# Patient Record
Sex: MALE | Race: WHITE | NOT HISPANIC OR LATINO | Employment: FULL TIME | URBAN - METROPOLITAN AREA
[De-identification: names, ages, dates, MRNs, and addresses within clinical notes are randomized per-mention and may not be internally consistent; named-entity substitution may affect disease eponyms.]

---

## 2017-02-03 ENCOUNTER — LAB CONVERSION - ENCOUNTER (OUTPATIENT)
Dept: OTHER | Facility: OTHER | Age: 57
End: 2017-02-03

## 2017-02-03 LAB — PROSTATE SPECIFIC ANTIGEN TOTAL (HISTORICAL): 1.2 NG/ML

## 2017-02-06 ENCOUNTER — ALLSCRIPTS OFFICE VISIT (OUTPATIENT)
Dept: OTHER | Facility: OTHER | Age: 57
End: 2017-02-06

## 2017-02-06 LAB
BILIRUB UR QL STRIP: NORMAL
CLARITY UR: NORMAL
COLOR UR: YELLOW
GLUCOSE (HISTORICAL): NORMAL
HGB UR QL STRIP.AUTO: NORMAL
KETONES UR STRIP-MCNC: NORMAL MG/DL
LEUKOCYTE ESTERASE UR QL STRIP: NORMAL
NITRITE UR QL STRIP: NORMAL
PH UR STRIP.AUTO: 5 [PH]
PROT UR STRIP-MCNC: NORMAL MG/DL
SP GR UR STRIP.AUTO: 1.01
UROBILINOGEN UR QL STRIP.AUTO: NORMAL

## 2017-09-01 ENCOUNTER — ALLSCRIPTS OFFICE VISIT (OUTPATIENT)
Dept: OTHER | Facility: OTHER | Age: 57
End: 2017-09-01

## 2017-10-25 ENCOUNTER — ANESTHESIA (OUTPATIENT)
Dept: GASTROENTEROLOGY | Facility: HOSPITAL | Age: 57
End: 2017-10-25
Payer: COMMERCIAL

## 2017-10-25 ENCOUNTER — ANESTHESIA EVENT (OUTPATIENT)
Dept: GASTROENTEROLOGY | Facility: HOSPITAL | Age: 57
End: 2017-10-25
Payer: COMMERCIAL

## 2017-10-25 ENCOUNTER — GENERIC CONVERSION - ENCOUNTER (OUTPATIENT)
Dept: OTHER | Facility: OTHER | Age: 57
End: 2017-10-25

## 2017-10-25 ENCOUNTER — HOSPITAL ENCOUNTER (OUTPATIENT)
Facility: HOSPITAL | Age: 57
Setting detail: OUTPATIENT SURGERY
Discharge: HOME | End: 2017-10-25
Attending: INTERNAL MEDICINE | Admitting: INTERNAL MEDICINE
Payer: COMMERCIAL

## 2017-10-25 VITALS
TEMPERATURE: 98.2 F | BODY MASS INDEX: 25.74 KG/M2 | HEART RATE: 74 BPM | DIASTOLIC BLOOD PRESSURE: 68 MMHG | RESPIRATION RATE: 16 BRPM | OXYGEN SATURATION: 99 % | SYSTOLIC BLOOD PRESSURE: 122 MMHG | WEIGHT: 207 LBS | HEIGHT: 75 IN

## 2017-10-25 DIAGNOSIS — Z80.0 FAMILY HISTORY OF MALIGNANT NEOPLASM OF DIGESTIVE ORGAN: ICD-10-CM

## 2017-10-25 PROCEDURE — 88305 TISSUE EXAM BY PATHOLOGIST: CPT | Performed by: INTERNAL MEDICINE

## 2017-10-25 RX ORDER — SODIUM CHLORIDE 9 MG/ML
125 INJECTION, SOLUTION INTRAVENOUS CONTINUOUS
Status: DISCONTINUED | OUTPATIENT
Start: 2017-10-25 | End: 2017-10-25 | Stop reason: HOSPADM

## 2017-10-25 RX ORDER — LEVOTHYROXINE SODIUM 175 UG/1
175 TABLET ORAL DAILY
COMMUNITY

## 2017-10-25 RX ORDER — PROPOFOL 10 MG/ML
INJECTION, EMULSION INTRAVENOUS AS NEEDED
Status: DISCONTINUED | OUTPATIENT
Start: 2017-10-25 | End: 2017-10-25 | Stop reason: SURG

## 2017-10-25 RX ORDER — PROPOFOL 10 MG/ML
INJECTION, EMULSION INTRAVENOUS CONTINUOUS PRN
Status: DISCONTINUED | OUTPATIENT
Start: 2017-10-25 | End: 2017-10-25 | Stop reason: SURG

## 2017-10-25 RX ADMIN — PROPOFOL 100 MCG/KG/MIN: 10 INJECTION, EMULSION INTRAVENOUS at 09:50

## 2017-10-25 RX ADMIN — PROPOFOL 50 MG: 10 INJECTION, EMULSION INTRAVENOUS at 09:55

## 2017-10-25 RX ADMIN — PROPOFOL 50 MG: 10 INJECTION, EMULSION INTRAVENOUS at 09:50

## 2017-10-25 RX ADMIN — SODIUM CHLORIDE: 0.9 INJECTION, SOLUTION INTRAVENOUS at 09:30

## 2017-10-25 NOTE — CONSULTS
Assessment  1  Family history of colon cancer (V16 0) (Z80 0)   2  Diabetes mellitus with insulin therapy (250 00,V58 67) (E11 9,Z79  4)    Plan  Family history of colon cancer    · MoviPrep 100 GM Oral Solution Reconstituted; USE AS DIRECTED   Rx By: Nadir Vazquez; Dispense: 0 Days ; #:1 Solution Reconstituted; Refill: 0;For: Family history of colon cancer; JEIMY = N; Sent To: [facility]/PHARMACY #[id number]  · Follow-up PRN Evaluation and Treatment  Follow-up  Status: Complete  Done:  92RGP4883 10:32AM   Ordered; For: Family history of colon cancer; Ordered By: Nadir Vazquez Performed:  Due: 10UIJ8630   · COLONOSCOPY (GI, SURG); Status:Hold For - Scheduling; Requested LES:[id number];    Perform:[facility]; Order Comments:hospital; OQI:[id number]; Ordered; For:Family history of colon cancer; Ordered By:Jumana Lambert;    Discussion/Summary  Discussion Summary:   He is overdue for a surveillance colonoscopy because of his family history of colon cancer  I spoke to him about the benefits and risks of the procedure as well as instructions for the bowel preparation and answered all of his questions  Given his history of diabetes mellitus with insulin dependence and on a pump, I will schedule his procedure at her hospital       Chief Complaint  Chief Complaint Free Text Note Form: Pt consult for colon; denies active symptoms  History of Present Illness  HPI: He presents for evaluation because of a family history of colon cancer  His brother was diagnosed with colon cancer and recently passed away from surgical complications related to the disease  Mr Melissa Sahni last colonoscopy was approximately 8 years ago and it was unremarkable  He has not had any bleeding, abdominal pain, change in bowel habits, or weight loss  He also denies any upper GI symptoms such as reflux or dysphagia  History Reviewed: The history was obtained today from the patient and I agree with the documented history        Review of Systems  Complete-Male GI Adult:   Constitutional: No fever or chills, feels well, no tiredness, no recent weight gain or weight loss  Eyes: No complaints of eye pain, no red eyes, no discharge from eyes, no itchy eyes  ENT: no complaints of earache, no hearing loss, no nosebleeds, no nasal discharge, no sore throat, no hoarseness  Cardiovascular: No complaints of slow heart rate, no fast heart rate, no chest pain, no palpitations, no leg claudication, no lower extremity  Respiratory: No complaints of shortness of breath, no wheezing, no cough, no SOB on exertion, no orthopnea or PND  Gastrointestinal: No complaints of abdominal pain, no constipation, no nausea or vomiting, no diarrhea or bloody stools-- and-- as noted in HPI  Genitourinary: No complaints of dysuria, no incontinence, no hesitancy, no nocturia, no genital lesion, no testicular pain  Musculoskeletal: No complaints of arthralgia, no myalgias, no joint swelling or stiffness, no limb pain or swelling  Integumentary: No complaints of skin rash or skin lesions, no itching, no skin wound, no dry skin  Neurological: No compliants of headache, no confusion, no convulsions, no numbness or tingling, no dizziness or fainting, no limb weakness, no difficulty walking  Psychiatric: Is not suicidal, no sleep disturbances, no anxiety or depression, no change in personality, no emotional problems  Endocrine: No complaints of proptosis, no hot flashes, no muscle weakness, no erectile dysfunction, no deepening of the voice, no feelings of weakness  Hematologic/Lymphatic: No complaints of swollen glands, no swollen glands in the neck, does not bleed easily, no easy bruising  ROS Reviewed:   ROS reviewed  Active Problems  1  Elevated prostate specific antigen (PSA) (790 93) (R97 20)   2  Organic impotence (607 84) (N52 9)   3  Urinary tract infection (599 0) (N39 0)   4  Urine Volume Has Decreased (Oliguria) (788 5)    Past Medical History  1   History of Reported Prostate Antigen Blood Test  Active Problems And Past Medical History Reviewed: The active problems and past medical history were reviewed and updated today  Surgical History  1  History of Thyroid Surgery Total Thyroidectomy  Surgical History Reviewed: The surgical history was reviewed and updated today  Family History  Family History Reviewed: The family history was reviewed and updated today  Social History   · Alcohol Use (History)   · Never A Smoker  Social History Reviewed: The social history was reviewed and updated today  Current Meds   1  HumaLOG 100 UNIT/ML Subcutaneous Solution; Therapy: 86INX1477 to (Last RI:63ODZ4737)  Requested for: 12LGG8045 Ordered   2  SLPG Compound Medication; alprostadil 5mcg/ml, phentolamine 0 5 mg/ml, papaverine   15mg/ml; Therapy: 11KWM1566 to (Last Rx:09Jan2015) Ordered   3  Synthroid 175 MCG Oral Tablet; Therapy: 82HMM3701 to (Last Rx:29Sep4108)  Requested for: 02Liq4294 Ordered  Medication List Reviewed: The medication list was reviewed and updated today  Allergies  1  Penicillins    Vitals  Vital Signs    Recorded: 01Sep2017 10:16AM   Temperature 96 6 F, Tympanic   Heart Rate 81   Systolic 023, LUE, Sitting   Diastolic 79, LUE, Sitting   BP CUFF SIZE Large   Height 6 ft 3 in   Weight 210 lb 4 oz   BMI Calculated 26 28   BSA Calculated 2 24   O2 Saturation 98, RA     Physical Exam    Constitutional   General appearance: No acute distress, well appearing and well nourished  Eyes   Conjunctiva and lids: No swelling, erythema, or discharge  Pupils and irises: Equal, round and reactive to light  Ears, Nose, Mouth, and Throat   External inspection of ears and nose: Normal     Nasal mucosa, septum, and turbinates: Normal without edema or erythema  Oropharynx: Normal with no erythema, edema, exudate or lesions  Pulmonary   Respiratory effort: No increased work of breathing or signs of respiratory distress      Auscultation of lungs: Clear to auscultation, equal breath sounds bilaterally, no wheezes, no rales, no rhonci  Cardiovascular   Auscultation of heart: Normal rate and rhythm, normal S1 and S2, without murmurs  Examination of extremities for edema and/or varicosities: Normal     Abdomen   Abdomen: Abnormal  -- insulin pump in place  Liver and spleen: No hepatomegaly or splenomegaly  Lymphatic   Palpation of lymph nodes in neck: No lymphadenopathy  Musculoskeletal   Gait and station: Normal     Digits and nails: Normal without clubbing or cyanosis  Inspection/palpation of joints, bones, and muscles: Normal     Skin   Skin and subcutaneous tissue: Normal without rashes or lesions      Psychiatric   Orientation to person, place and time: Normal     Mood and affect: Normal          Signatures   Electronically signed by : uSjey Dobson MD; Sep  1 2017 10:33AM EST                       (Author)

## 2017-10-25 NOTE — ANESTHESIA PREPROCEDURE EVALUATION
Review of Systems/Medical History  Patient summary reviewed    No history of anesthetic complications     Cardiovascular  Negative cardio ROS    Pulmonary       GI/Hepatic            Endo/Other  Diabetes Insulin, History of thyroid disease , hypothyroidism,      GYN       Hematology   Musculoskeletal       Neurology   Psychology           Physical Exam    Airway    Mallampati score: II  TM Distance: >3 FB  Neck ROM: full     Dental       Cardiovascular  Comment: Negative ROS,     Pulmonary      Other Findings        Anesthesia Plan  ASA Score- 2       Anesthesia Type- IV sedation with anesthesia with ASA Monitors  Additional Monitors:   Airway Plan:           Induction- intravenous  Informed Consent- Anesthetic plan and risks discussed with patient  I personally reviewed this patient with the CRNA  Discussed and agreed on the Anesthesia Plan with the CRNA  Zarina Golden

## 2017-10-25 NOTE — OP NOTE
**** GI/ENDOSCOPY REPORT ****     PATIENT NAME: Liz Malone ------ VISIT ID:  Patient ID:   [id number] YOB: 1960     INTRODUCTION: Colonoscopy - A 65 male patient presents for an outpatient   Colonoscopy at [address] [place] [address]  PREVIOUS COLONOSCOPY: 8 years ago     INDICATIONS: Screening for family history of colorectal cancer, including   the patient's brother  CONSENT:  The benefits, risks, and alternatives to the procedure were   discussed and informed consent was obtained from the patient  PREPARATION: EKG, pulse, pulse oximetry and blood pressure were monitored   throughout the procedure  The patient was identified by myself both   verbally and by visual inspection of ID band  Airway Assessment   Classification: Airway class 2 - Visualization of the soft palate, fauces   and uvula  ASA Classification: Class 2 - Patient has mild to moderate   systemic disturbance that may or may not be related to the disorder   requiring surgery  MEDICATIONS: Anesthesia-check records     PROCEDURE:  The endoscope was passed without difficulty through the anus   under direct visualization and advanced to the cecum, confirmed by   appendiceal orifice and ileocecal valve  The scope was withdrawn and the   mucosa was carefully examined  The quality of the preparation was good  Retroflexion was performed  Cecal Intubation Time: 4 minutes(s) Scope   Withdrawal Time: 10 minutes(s)     RECTAL EXAM: Normal rectal exam      FINDINGS:  Two sessile polyps, measuring less than 5 mm in size, were   found in the colon  The polyps were removed by cold biopsy polypectomy  There was evidence of diverticulosis in the sigmoid colon  COMPLICATIONS: There were no complications  IMPRESSIONS: Two sessile polyps found in the colon; removed by cold biopsy   polypectomy  Diverticulosis found in the sigmoid colon       RECOMMENDATIONS: Follow-up on the results of the biopsy specimens  Colonoscopy recommended in 5 years or sooner if clinically indicated  ESTIMATED BLOOD LOSS:     PATHOLOGY SPECIMENS: Removed by cold biopsy polypectomy  PROCEDURE CODES:     ICD-9 Codes: V16 0 Family history of malignant neoplasm of   gastrointestinal tract 562 10 Diverticulosis of colon (without mention of   hemorrhage)     ICD-10 Codes: Z80 0 Family history of malignant neoplasm of digestive   organs K63 5 Polyp of colon K57 Diverticular disease of intestine     PERFORMED BY: ANNA Montez  on 10/25/2017  Version 1, electronically signed by ANNA Carr  on 10/25/2017   at 10:33

## 2017-10-25 NOTE — ANESTHESIA POSTPROCEDURE EVALUATION
Post-Op Assessment Note      CV Status:  Stable    Mental Status:  Alert and awake    Hydration Status:  Euvolemic    PONV Controlled:  Controlled    Airway Patency:  Patent    Post Op Vitals Reviewed: Yes          Staff: CRNA           /69 (10/25/17 1013)    Temp      Pulse 82 (10/25/17 1013)   Resp 16 (10/25/17 1013)    SpO2 99 % (10/25/17 1013)

## 2017-10-28 ENCOUNTER — GENERIC CONVERSION - ENCOUNTER (OUTPATIENT)
Dept: OTHER | Facility: OTHER | Age: 57
End: 2017-10-28

## 2017-11-07 ENCOUNTER — APPOINTMENT (OUTPATIENT)
Dept: LAB | Age: 57
End: 2017-11-07
Payer: COMMERCIAL

## 2017-11-07 ENCOUNTER — TRANSCRIBE ORDERS (OUTPATIENT)
Dept: ADMINISTRATIVE | Age: 57
End: 2017-11-07

## 2017-11-07 DIAGNOSIS — E89.0 POSTSURGICAL HYPOTHYROIDISM: ICD-10-CM

## 2017-11-07 DIAGNOSIS — C73 MALIGNANT NEOPLASM OF THYROID GLAND (HCC): Primary | ICD-10-CM

## 2017-11-07 DIAGNOSIS — C73 MALIGNANT NEOPLASM OF THYROID GLAND (HCC): ICD-10-CM

## 2017-11-07 DIAGNOSIS — E10.9 TYPE 1 DIABETES MELLITUS WITHOUT COMPLICATION (HCC): ICD-10-CM

## 2017-11-07 LAB
ALBUMIN SERPL BCP-MCNC: 3.5 G/DL (ref 3.5–5)
ALP SERPL-CCNC: 84 U/L (ref 46–116)
ALT SERPL W P-5'-P-CCNC: 40 U/L (ref 12–78)
ANION GAP SERPL CALCULATED.3IONS-SCNC: 3 MMOL/L (ref 4–13)
AST SERPL W P-5'-P-CCNC: 28 U/L (ref 5–45)
BASOPHILS # BLD AUTO: 0.04 THOUSANDS/ΜL (ref 0–0.1)
BASOPHILS NFR BLD AUTO: 1 % (ref 0–1)
BILIRUB SERPL-MCNC: 0.69 MG/DL (ref 0.2–1)
BILIRUB UR QL STRIP: NEGATIVE
BUN SERPL-MCNC: 16 MG/DL (ref 5–25)
CALCIUM SERPL-MCNC: 8.4 MG/DL (ref 8.3–10.1)
CHLORIDE SERPL-SCNC: 103 MMOL/L (ref 100–108)
CLARITY UR: CLEAR
CO2 SERPL-SCNC: 32 MMOL/L (ref 21–32)
COLOR UR: YELLOW
CREAT SERPL-MCNC: 0.95 MG/DL (ref 0.6–1.3)
CREAT UR-MCNC: 57.9 MG/DL
EOSINOPHIL # BLD AUTO: 0.2 THOUSAND/ΜL (ref 0–0.61)
EOSINOPHIL NFR BLD AUTO: 3 % (ref 0–6)
ERYTHROCYTE [DISTWIDTH] IN BLOOD BY AUTOMATED COUNT: 12.6 % (ref 11.6–15.1)
EST. AVERAGE GLUCOSE BLD GHB EST-MCNC: 146 MG/DL
GFR SERPL CREATININE-BSD FRML MDRD: 89 ML/MIN/1.73SQ M
GLUCOSE P FAST SERPL-MCNC: 124 MG/DL (ref 65–99)
GLUCOSE UR STRIP-MCNC: NEGATIVE MG/DL
HBA1C MFR BLD: 6.7 % (ref 4.2–6.3)
HCT VFR BLD AUTO: 42.3 % (ref 36.5–49.3)
HGB BLD-MCNC: 14.7 G/DL (ref 12–17)
HGB UR QL STRIP.AUTO: NEGATIVE
KETONES UR STRIP-MCNC: NEGATIVE MG/DL
LEUKOCYTE ESTERASE UR QL STRIP: NEGATIVE
LYMPHOCYTES # BLD AUTO: 1.73 THOUSANDS/ΜL (ref 0.6–4.47)
LYMPHOCYTES NFR BLD AUTO: 30 % (ref 14–44)
MAGNESIUM SERPL-MCNC: 2.5 MG/DL (ref 1.6–2.6)
MCH RBC QN AUTO: 30.9 PG (ref 26.8–34.3)
MCHC RBC AUTO-ENTMCNC: 34.8 G/DL (ref 31.4–37.4)
MCV RBC AUTO: 89 FL (ref 82–98)
MICROALBUMIN UR-MCNC: <5 MG/L (ref 0–20)
MICROALBUMIN/CREAT 24H UR: <9 MG/G CREATININE (ref 0–30)
MONOCYTES # BLD AUTO: 0.89 THOUSAND/ΜL (ref 0.17–1.22)
MONOCYTES NFR BLD AUTO: 15 % (ref 4–12)
NEUTROPHILS # BLD AUTO: 2.97 THOUSANDS/ΜL (ref 1.85–7.62)
NEUTS SEG NFR BLD AUTO: 51 % (ref 43–75)
NITRITE UR QL STRIP: NEGATIVE
NRBC BLD AUTO-RTO: 0 /100 WBCS
PH UR STRIP.AUTO: 7 [PH] (ref 4.5–8)
PHOSPHATE SERPL-MCNC: 3.1 MG/DL (ref 2.7–4.5)
PLATELET # BLD AUTO: 289 THOUSANDS/UL (ref 149–390)
PMV BLD AUTO: 11.1 FL (ref 8.9–12.7)
POTASSIUM SERPL-SCNC: 4 MMOL/L (ref 3.5–5.3)
PROT SERPL-MCNC: 6.8 G/DL (ref 6.4–8.2)
PROT UR STRIP-MCNC: NEGATIVE MG/DL
RBC # BLD AUTO: 4.75 MILLION/UL (ref 3.88–5.62)
SODIUM SERPL-SCNC: 138 MMOL/L (ref 136–145)
SP GR UR STRIP.AUTO: 1.01 (ref 1–1.03)
T4 FREE SERPL-MCNC: 1.33 NG/DL (ref 0.76–1.46)
TSH SERPL DL<=0.05 MIU/L-ACNC: 0.03 UIU/ML (ref 0.36–3.74)
UROBILINOGEN UR QL STRIP.AUTO: 0.2 E.U./DL
WBC # BLD AUTO: 5.86 THOUSAND/UL (ref 4.31–10.16)

## 2017-11-07 PROCEDURE — 86800 THYROGLOBULIN ANTIBODY: CPT

## 2017-11-07 PROCEDURE — 84100 ASSAY OF PHOSPHORUS: CPT

## 2017-11-07 PROCEDURE — 83735 ASSAY OF MAGNESIUM: CPT

## 2017-11-07 PROCEDURE — 36415 COLL VENOUS BLD VENIPUNCTURE: CPT

## 2017-11-07 PROCEDURE — 83036 HEMOGLOBIN GLYCOSYLATED A1C: CPT

## 2017-11-07 PROCEDURE — 81003 URINALYSIS AUTO W/O SCOPE: CPT | Performed by: SPECIALIST

## 2017-11-07 PROCEDURE — 85025 COMPLETE CBC W/AUTO DIFF WBC: CPT

## 2017-11-07 PROCEDURE — 84432 ASSAY OF THYROGLOBULIN: CPT

## 2017-11-07 PROCEDURE — 82043 UR ALBUMIN QUANTITATIVE: CPT | Performed by: SPECIALIST

## 2017-11-07 PROCEDURE — 80053 COMPREHEN METABOLIC PANEL: CPT

## 2017-11-07 PROCEDURE — 84443 ASSAY THYROID STIM HORMONE: CPT

## 2017-11-07 PROCEDURE — 84439 ASSAY OF FREE THYROXINE: CPT

## 2017-11-07 PROCEDURE — 82570 ASSAY OF URINE CREATININE: CPT | Performed by: SPECIALIST

## 2017-11-08 LAB
THYROGLOB AB SERPL-ACNC: <1 IU/ML (ref 0–0.9)
THYROGLOB SERPL-MCNC: <0.1 NG/ML (ref 1.4–29.2)

## 2018-01-13 VITALS
DIASTOLIC BLOOD PRESSURE: 80 MMHG | BODY MASS INDEX: 26.24 KG/M2 | SYSTOLIC BLOOD PRESSURE: 120 MMHG | WEIGHT: 211 LBS | HEIGHT: 75 IN

## 2018-01-14 VITALS
BODY MASS INDEX: 26.14 KG/M2 | DIASTOLIC BLOOD PRESSURE: 79 MMHG | HEART RATE: 81 BPM | OXYGEN SATURATION: 98 % | SYSTOLIC BLOOD PRESSURE: 128 MMHG | TEMPERATURE: 96.6 F | WEIGHT: 210.25 LBS | HEIGHT: 75 IN

## 2018-01-15 NOTE — RESULT NOTES
Discussion/Summary   No precancerous polyps found  Repeat colonoscopy in 5 years because of his family history of colon cancer  Verified Results  (1) TISSUE EXAM 25Oct2017 10:04AM Nova Brooks     Test Name Result Flag Reference   LAB AP CASE REPORT (Report)     Surgical Pathology Report             Case: [id number]                   Authorizing Provider: Luke Douglas MD      Collected:      10/25/2017 1004        Ordering Location:   [address] [place] [address]   Received:      10/25/2017 [facility]                               Pathologist:      Mai Thomas MD                             Specimen:  Large Intestine, Sigmoid Colon, cold bx, polyps   LAB AP FINAL DIAGNOSIS      A  Colon, sigmoid polyps, biopsies:  - Portions of benign colonic mucosa with mucosal lymphoid aggregates,   negative for dysplasia or carcinoma  Electronically signed by Mai Thomas MD on 10/27/2017 at 3:10 PM   LAB AP SURGICAL ADDITIONAL INFORMATION (Report)     All controls performed with the immunohistochemical stains reported above   reacted appropriately  These tests were developed and their performance   characteristics determined by [facility] or   [address] [place] [address]  They may not be cleared or approved by the [place]  Food and Drug Administration  The FDA has determined that such clearance   or approval is not necessary  These tests are used for clinical purposes  They should not be regarded as investigational or for research  This   laboratory has been approved by [facility], designated as a high-complexity   laboratory and is qualified to perform these tests  Interpretation performed at , [address]    LAB AP GROSS DESCRIPTION (Report)     A  The specimen is received in formalin, labeled with the patient's name   and hospital number, and is designated Sigmoid colon polyps   The   specimen consists of 3 tan-pink soft tissue fragments measuring 0 3 cm,   0 5 cm, and 0 6 cm in greatest dimension  Entirely submitted  One   cassette  Note: The estimated total formalin fixation time based upon information   provided by the submitting clinician and the standard processing schedule   is under 72 hours  MAS   LAB AP CLINICAL INFORMATION 2 polyps     2 polyps

## 2018-01-29 DIAGNOSIS — R97.20 ELEVATED PROSTATE SPECIFIC ANTIGEN (PSA): ICD-10-CM

## 2018-02-12 ENCOUNTER — TRANSCRIBE ORDERS (OUTPATIENT)
Dept: ADMINISTRATIVE | Age: 58
End: 2018-02-12

## 2018-02-12 ENCOUNTER — APPOINTMENT (OUTPATIENT)
Dept: LAB | Age: 58
End: 2018-02-12
Payer: COMMERCIAL

## 2018-02-12 DIAGNOSIS — R97.20 ELEVATED PSA: Primary | ICD-10-CM

## 2018-02-12 DIAGNOSIS — R97.20 ELEVATED PSA: ICD-10-CM

## 2018-02-12 LAB — PSA SERPL-MCNC: 2 NG/ML (ref 0–4)

## 2018-02-12 PROCEDURE — 36415 COLL VENOUS BLD VENIPUNCTURE: CPT

## 2018-02-12 PROCEDURE — G0103 PSA SCREENING: HCPCS

## 2018-02-21 NOTE — PROGRESS NOTES
2/22/2018    Aaron Bran  1960  [id number]      Assessment  -Elevated PSA  -History of UTI    Discussion/Plan  Jerson Dobson is a 65 y o  male being managed by Dr Caleb Downey        -We discussed that given patient's low risk of prostate cancer, CAROL will be performed at next visit in 1 year with PSA  -All questions answered, patient agrees with plan      History of Present Illness  65 y o  male with a history of elevated PSA and UTI presents today for follow up  PSA today is 2 0, which is within his baseline of 1 2-4 0  Patient has history of elevated PSA of 46 4 in 2014 s/p UTI  No significant family history of prostate cancer  He denies any gross hematuria, dysuria, voids without difficulty, and no recent UTI  Patient had been using Trimix in the past, but no longer needs and is able to obtain and sustain an erection  No overall changes in health  Review of Systems  Review of Systems   Constitutional: Negative  HENT: Negative  Respiratory: Negative  Cardiovascular: Negative  Gastrointestinal: Negative  Genitourinary: Negative for decreased urine volume, difficulty urinating, dysuria, flank pain, frequency, hematuria and urgency  Musculoskeletal: Negative  Skin: Negative  Neurological: Negative  Psychiatric/Behavioral: Negative  Past Medical History  Past Medical History:   Diagnosis Date    Cancer ([facility] [facility] )     thyroid    Diabetes mellitus ([facility] [facility] )     Disease of thyroid gland        Past Social History  Past Surgical History:   Procedure Laterality Date    IN COLONOSCOPY FLX DX W/COLLJ SPEC WHEN PFRMD N/A 10/25/2017    Procedure: COLONOSCOPY;  Surgeon: Sera Busch MD;  Location: [facility]; Service: Gastroenterology       Past Family History  No family history on file  Past Social history  Social History     Social History    Marital status:      Spouse name: N/A    Number of children: N/A    Years of education: N/A     Occupational History    Not on file  Social History Main Topics    Smoking status: Never Smoker    Smokeless tobacco: Never Used    Alcohol use Yes      Comment: rarely    Drug use: No    Sexual activity: Not on file     Other Topics Concern    Not on file     Social History Narrative    No narrative on file       Current Medications  Current Outpatient Prescriptions   Medication Sig Dispense Refill    insulin lispro (HumaLOG) 100 units/mL injection Inject 55 Units under the skin continuous      levothyroxine 175 mcg tablet Take 175 mcg by mouth daily       No current facility-administered medications for this visit  Allergies  Allergies   Allergen Reactions    Penicillins Hives       Past Medical History, Social History, Family History, medications and allergies were reviewed  Vitals  There were no vitals filed for this visit  Physical Exam  Physical Exam   Constitutional: He is oriented to person, place, and time  He appears well-developed and well-nourished  HENT:   Head: Normocephalic  Eyes: Pupils are equal, round, and reactive to light  Neck: Normal range of motion  Cardiovascular: Normal rate and regular rhythm  Pulmonary/Chest: Effort normal    Abdominal: Soft  Normal appearance  There is no CVA tenderness  Musculoskeletal: Normal range of motion  Neurological: He is alert and oriented to person, place, and time  He has normal reflexes  Skin: Skin is warm and dry  Psychiatric: He has a normal mood and affect   His behavior is normal  Judgment and thought content normal        Results  Lab Results   Component Value Date    PSA 2 0 02/12/2018    PSA 1 8 10/14/2014    PSA 4 0 06/09/2014     Lab Results   Component Value Date    GLUCOSE 124 10/14/2014    CALCIUM 8 4 11/07/2017     11/07/2017    K 4 0 11/07/2017    CO2 32 11/07/2017     11/07/2017    BUN 16 11/07/2017    CREATININE 0 95 11/07/2017     Lab Results   Component Value Date    WBC 5 86 11/07/2017    HGB 14 7 11/07/2017    HCT 42 3 11/07/2017    MCV 89 11/07/2017     11/07/2017     No results found for this or any previous visit (from the past 1 hour(s))    Lab Results   Component Value Date    PSA 2 0 02/12/2018    PSA 1 8 10/14/2014    PSA 4 0 06/09/2014

## 2018-02-22 ENCOUNTER — OFFICE VISIT (OUTPATIENT)
Dept: UROLOGY | Facility: AMBULATORY SURGERY CENTER | Age: 58
End: 2018-02-22
Payer: COMMERCIAL

## 2018-02-22 VITALS
DIASTOLIC BLOOD PRESSURE: 76 MMHG | BODY MASS INDEX: 25.82 KG/M2 | WEIGHT: 212 LBS | SYSTOLIC BLOOD PRESSURE: 138 MMHG | HEART RATE: 78 BPM | HEIGHT: 76 IN

## 2018-02-22 DIAGNOSIS — Z12.5 PROSTATE CANCER SCREENING: Primary | ICD-10-CM

## 2018-02-22 PROCEDURE — 99213 OFFICE O/P EST LOW 20 MIN: CPT | Performed by: NURSE PRACTITIONER

## 2018-02-22 RX ORDER — MONTELUKAST SODIUM 10 MG/1
TABLET ORAL
COMMUNITY
Start: 2018-01-15

## 2018-02-22 RX ORDER — AZITHROMYCIN 500 MG/1
TABLET, FILM COATED ORAL
COMMUNITY
Start: 2018-01-16 | End: 2021-12-17 | Stop reason: ALTCHOICE

## 2018-02-22 RX ORDER — POLYETHYLENE GLYCOL 3350, SODIUM SULFATE, SODIUM CHLORIDE, POTASSIUM CHLORIDE, ASCORBIC ACID, SODIUM ASCORBATE 7.5-2.691G
KIT ORAL
COMMUNITY
Start: 2017-09-01 | End: 2021-12-17 | Stop reason: ALTCHOICE

## 2018-05-06 ENCOUNTER — OFFICE VISIT (OUTPATIENT)
Dept: URGENT CARE | Age: 58
End: 2018-05-06
Payer: COMMERCIAL

## 2018-05-06 ENCOUNTER — APPOINTMENT (OUTPATIENT)
Dept: RADIOLOGY | Age: 58
End: 2018-05-06
Payer: COMMERCIAL

## 2018-05-06 VITALS
HEART RATE: 68 BPM | SYSTOLIC BLOOD PRESSURE: 122 MMHG | OXYGEN SATURATION: 98 % | BODY MASS INDEX: 25.82 KG/M2 | HEIGHT: 76 IN | DIASTOLIC BLOOD PRESSURE: 82 MMHG | RESPIRATION RATE: 18 BRPM | WEIGHT: 212 LBS | TEMPERATURE: 97.5 F

## 2018-05-06 DIAGNOSIS — S69.92XA JAMMED INTERPHALANGEAL JOINT OF FINGER OF LEFT HAND, INITIAL ENCOUNTER: Primary | ICD-10-CM

## 2018-05-06 DIAGNOSIS — S69.92XA INJURY, FINGER, LEFT, INITIAL ENCOUNTER: ICD-10-CM

## 2018-05-06 PROCEDURE — 99213 OFFICE O/P EST LOW 20 MIN: CPT | Performed by: FAMILY MEDICINE

## 2018-05-06 PROCEDURE — 73130 X-RAY EXAM OF HAND: CPT

## 2018-05-06 PROCEDURE — 29130 APPL FINGER SPLINT STATIC: CPT | Performed by: NURSE PRACTITIONER

## 2018-05-06 RX ORDER — LORATADINE 10 MG/1
10 TABLET ORAL DAILY
COMMUNITY
End: 2021-12-17 | Stop reason: ALTCHOICE

## 2018-05-06 NOTE — PROGRESS NOTES
Pt was playing tug a war with a dog 8 days ago and the dog somehow pulled  Pt's left index finger to the side and he felt pain instantly  The next day it started to swell  Has pain and swelling more at the PIP  Certain movements hurt more  Swelling didn't resolve and yesterday he was using it more, so today it was worse

## 2018-05-06 NOTE — PROGRESS NOTES
[facility]        NAME: Raina Leigh is a 65 y o  male  : 1960    MRN: [id number]  DATE: May 6, 2018  TIME: 7:25 PM    Assessment and Plan   Jammed finger (interphalangeal joint), right, initial encounter Daly Dominick  1  Jammed finger (interphalangeal joint), right, initial encounter  Splint   2  Injury, finger, left, initial encounter  XR hand 3+ vw left         Patient Instructions     Patient Instructions   Rest   Finger splint for comfort  Finger splint applied  Gentle stretching of finger  Motrin as needed  Xray- no acute fracture  Will call you if final read is different  Follow up with hand specialist         Chief Complaint     Chief Complaint   Patient presents with    Finger Swelling         History of Present Illness   Raina Leigh presents to the clinic c/o    This is a 65year old male here today with complaints of left index finger injury  He was playing tug of war with dog  He states he is finger was pulled to side and he felt pain  He noticed swelling and pain over the next 8 days  Injury happened 8 days ago  Bending makes pain worse  He continues to have swelling so he is concerned  He is left handed/         Review of Systems   Review of Systems   Constitutional: Negative  HENT: Negative  Musculoskeletal:        Left index finger pain  Neurological: Negative  Psychiatric/Behavioral: Negative            Current Medications     Long-Term Prescriptions   Medication Sig Dispense Refill    insulin lispro (HumaLOG) 100 units/mL injection Inject 55 Units under the skin continuous      levothyroxine 175 mcg tablet Take 175 mcg by mouth daily      loratadine (CLARITIN) 10 mg tablet Take 10 mg by mouth daily      montelukast (SINGULAIR) 10 mg tablet       PEG 3350-KCl-NaCl-NaSulf-Na Asc-C (MOVIPREP) 100 g Take by mouth         Current Allergies     Allergies as of 2018 - Reviewed 2018   Allergen Reaction Noted    Penicillins Hives 10/25/2017 The following portions of the patient's history were reviewed and updated as appropriate: allergies, current medications, past family history, past medical history, past social history, past surgical history and problem list     Objective   /82 (BP Location: Left arm, Patient Position: Sitting, Cuff Size: Standard)   Pulse 68   Temp 97 5 °F (36 4 °C) (Temporal)   Resp 18   Ht 6' 3 5" (1 918 m)   Wt 96 2 kg (212 lb)   SpO2 98%   BMI 26 15 kg/m²        Physical Exam     Physical Exam   Constitutional: He is oriented to person, place, and time  He appears well-developed and well-nourished  Pulmonary/Chest: Effort normal    Musculoskeletal:   Left index finger: TTP over the PIP joint  Swelling noted, no redness or erythema  Decreased ROM due to pain and swelling  Neurological: He is alert and oriented to person, place, and time  Skin: Skin is warm and dry  Psychiatric: He has a normal mood and affect  His behavior is normal    Nursing note and vitals reviewed  Radiology: left hand no acute fracture

## 2018-05-06 NOTE — PATIENT INSTRUCTIONS
Rest   Finger splint for comfort  Finger splint applied  Gentle stretching of finger  Motrin as needed  Xray- no acute fracture  Will call you if final read is different    Follow up with hand specialist

## 2018-07-14 ENCOUNTER — APPOINTMENT (OUTPATIENT)
Dept: LAB | Age: 58
End: 2018-07-14
Payer: COMMERCIAL

## 2018-07-14 ENCOUNTER — TRANSCRIBE ORDERS (OUTPATIENT)
Dept: ADMINISTRATIVE | Age: 58
End: 2018-07-14

## 2018-07-14 DIAGNOSIS — C73 MALIGNANT NEOPLASM OF THYROID GLAND (HCC): ICD-10-CM

## 2018-07-14 DIAGNOSIS — C73 MALIGNANT NEOPLASM OF THYROID GLAND (HCC): Primary | ICD-10-CM

## 2018-07-14 DIAGNOSIS — E10.9 TYPE 1 DIABETES MELLITUS WITHOUT COMPLICATION (HCC): ICD-10-CM

## 2018-07-14 DIAGNOSIS — E89.0 POSTSURGICAL HYPOTHYROIDISM: ICD-10-CM

## 2018-07-14 LAB
ALBUMIN SERPL BCP-MCNC: 3.5 G/DL (ref 3.5–5)
ALP SERPL-CCNC: 85 U/L (ref 46–116)
ALT SERPL W P-5'-P-CCNC: 42 U/L (ref 12–78)
ANION GAP SERPL CALCULATED.3IONS-SCNC: 3 MMOL/L (ref 4–13)
AST SERPL W P-5'-P-CCNC: 27 U/L (ref 5–45)
BASOPHILS # BLD AUTO: 0.06 THOUSANDS/ΜL (ref 0–0.1)
BASOPHILS NFR BLD AUTO: 1 % (ref 0–1)
BILIRUB SERPL-MCNC: 0.72 MG/DL (ref 0.2–1)
BILIRUB UR QL STRIP: NEGATIVE
BUN SERPL-MCNC: 18 MG/DL (ref 5–25)
CALCIUM SERPL-MCNC: 8.6 MG/DL (ref 8.3–10.1)
CHLORIDE SERPL-SCNC: 107 MMOL/L (ref 100–108)
CLARITY UR: CLEAR
CO2 SERPL-SCNC: 30 MMOL/L (ref 21–32)
COLOR UR: YELLOW
CREAT SERPL-MCNC: 1.04 MG/DL (ref 0.6–1.3)
CREAT UR-MCNC: 181 MG/DL
EOSINOPHIL # BLD AUTO: 0.2 THOUSAND/ΜL (ref 0–0.61)
EOSINOPHIL NFR BLD AUTO: 4 % (ref 0–6)
ERYTHROCYTE [DISTWIDTH] IN BLOOD BY AUTOMATED COUNT: 12.8 % (ref 11.6–15.1)
EST. AVERAGE GLUCOSE BLD GHB EST-MCNC: 137 MG/DL
GFR SERPL CREATININE-BSD FRML MDRD: 80 ML/MIN/1.73SQ M
GLUCOSE P FAST SERPL-MCNC: 140 MG/DL (ref 65–99)
GLUCOSE UR STRIP-MCNC: NEGATIVE MG/DL
HBA1C MFR BLD: 6.4 % (ref 4.2–6.3)
HCT VFR BLD AUTO: 44.7 % (ref 36.5–49.3)
HGB BLD-MCNC: 14.6 G/DL (ref 12–17)
HGB UR QL STRIP.AUTO: NEGATIVE
IMM GRANULOCYTES # BLD AUTO: 0.02 THOUSAND/UL (ref 0–0.2)
IMM GRANULOCYTES NFR BLD AUTO: 0 % (ref 0–2)
KETONES UR STRIP-MCNC: NEGATIVE MG/DL
LEUKOCYTE ESTERASE UR QL STRIP: NEGATIVE
LYMPHOCYTES # BLD AUTO: 1.85 THOUSANDS/ΜL (ref 0.6–4.47)
LYMPHOCYTES NFR BLD AUTO: 33 % (ref 14–44)
MAGNESIUM SERPL-MCNC: 2.3 MG/DL (ref 1.6–2.6)
MCH RBC QN AUTO: 30.5 PG (ref 26.8–34.3)
MCHC RBC AUTO-ENTMCNC: 32.7 G/DL (ref 31.4–37.4)
MCV RBC AUTO: 93 FL (ref 82–98)
MICROALBUMIN UR-MCNC: <5 MG/L (ref 0–20)
MICROALBUMIN/CREAT 24H UR: <3 MG/G CREATININE (ref 0–30)
MONOCYTES # BLD AUTO: 0.68 THOUSAND/ΜL (ref 0.17–1.22)
MONOCYTES NFR BLD AUTO: 12 % (ref 4–12)
NEUTROPHILS # BLD AUTO: 2.84 THOUSANDS/ΜL (ref 1.85–7.62)
NEUTS SEG NFR BLD AUTO: 50 % (ref 43–75)
NITRITE UR QL STRIP: NEGATIVE
NRBC BLD AUTO-RTO: 0 /100 WBCS
PH UR STRIP.AUTO: 7 [PH] (ref 4.5–8)
PHOSPHATE SERPL-MCNC: 3 MG/DL (ref 2.7–4.5)
PLATELET # BLD AUTO: 286 THOUSANDS/UL (ref 149–390)
PMV BLD AUTO: 10.8 FL (ref 8.9–12.7)
POTASSIUM SERPL-SCNC: 4.4 MMOL/L (ref 3.5–5.3)
PROT SERPL-MCNC: 6.9 G/DL (ref 6.4–8.2)
PROT UR STRIP-MCNC: NEGATIVE MG/DL
RBC # BLD AUTO: 4.79 MILLION/UL (ref 3.88–5.62)
SODIUM SERPL-SCNC: 140 MMOL/L (ref 136–145)
SP GR UR STRIP.AUTO: 1.02 (ref 1–1.03)
T4 FREE SERPL-MCNC: 1.23 NG/DL (ref 0.76–1.46)
TSH SERPL DL<=0.05 MIU/L-ACNC: 0.06 UIU/ML (ref 0.36–3.74)
UROBILINOGEN UR QL STRIP.AUTO: 1 E.U./DL
WBC # BLD AUTO: 5.65 THOUSAND/UL (ref 4.31–10.16)

## 2018-07-14 PROCEDURE — 81003 URINALYSIS AUTO W/O SCOPE: CPT | Performed by: SPECIALIST

## 2018-07-14 PROCEDURE — 82570 ASSAY OF URINE CREATININE: CPT | Performed by: SPECIALIST

## 2018-07-14 PROCEDURE — 85025 COMPLETE CBC W/AUTO DIFF WBC: CPT

## 2018-07-14 PROCEDURE — 83036 HEMOGLOBIN GLYCOSYLATED A1C: CPT

## 2018-07-14 PROCEDURE — 83735 ASSAY OF MAGNESIUM: CPT

## 2018-07-14 PROCEDURE — 82043 UR ALBUMIN QUANTITATIVE: CPT | Performed by: SPECIALIST

## 2018-07-14 PROCEDURE — 84443 ASSAY THYROID STIM HORMONE: CPT

## 2018-07-14 PROCEDURE — 36415 COLL VENOUS BLD VENIPUNCTURE: CPT

## 2018-07-14 PROCEDURE — 84439 ASSAY OF FREE THYROXINE: CPT

## 2018-07-14 PROCEDURE — 86800 THYROGLOBULIN ANTIBODY: CPT

## 2018-07-14 PROCEDURE — 80053 COMPREHEN METABOLIC PANEL: CPT

## 2018-07-14 PROCEDURE — 84100 ASSAY OF PHOSPHORUS: CPT

## 2018-07-14 PROCEDURE — 84432 ASSAY OF THYROGLOBULIN: CPT

## 2018-07-17 LAB
THYROGLOB AB SERPL-ACNC: <1 IU/ML (ref 0–0.9)
THYROGLOB SERPL-MCNC: <0.1 NG/ML (ref 1.4–29.2)

## 2019-03-09 ENCOUNTER — APPOINTMENT (OUTPATIENT)
Dept: LAB | Age: 59
End: 2019-03-09
Payer: COMMERCIAL

## 2019-03-09 ENCOUNTER — TRANSCRIBE ORDERS (OUTPATIENT)
Dept: ADMINISTRATIVE | Age: 59
End: 2019-03-09

## 2019-03-09 DIAGNOSIS — IMO0002 TYPE I DIABETES MELLITUS WITH RENAL MANIFESTATIONS, UNCONTROLLED: ICD-10-CM

## 2019-03-09 DIAGNOSIS — I51.9 MYXEDEMA HEART DISEASE: ICD-10-CM

## 2019-03-09 DIAGNOSIS — C73 MALIGNANT NEOPLASM OF THYROID GLAND (HCC): Primary | ICD-10-CM

## 2019-03-09 DIAGNOSIS — C73 MALIGNANT NEOPLASM OF THYROID GLAND (HCC): ICD-10-CM

## 2019-03-09 DIAGNOSIS — E03.9 MYXEDEMA HEART DISEASE: ICD-10-CM

## 2019-03-09 LAB
ALBUMIN SERPL BCP-MCNC: 3.7 G/DL (ref 3.5–5)
ALP SERPL-CCNC: 89 U/L (ref 46–116)
ALT SERPL W P-5'-P-CCNC: 51 U/L (ref 12–78)
ANION GAP SERPL CALCULATED.3IONS-SCNC: 4 MMOL/L (ref 4–13)
AST SERPL W P-5'-P-CCNC: 31 U/L (ref 5–45)
BASOPHILS # BLD AUTO: 0.08 THOUSANDS/ΜL (ref 0–0.1)
BASOPHILS NFR BLD AUTO: 1 % (ref 0–1)
BILIRUB SERPL-MCNC: 0.66 MG/DL (ref 0.2–1)
BILIRUB UR QL STRIP: NEGATIVE
BUN SERPL-MCNC: 22 MG/DL (ref 5–25)
CALCIUM SERPL-MCNC: 8.9 MG/DL (ref 8.3–10.1)
CHLORIDE SERPL-SCNC: 104 MMOL/L (ref 100–108)
CLARITY UR: CLEAR
CO2 SERPL-SCNC: 31 MMOL/L (ref 21–32)
COLOR UR: YELLOW
CREAT SERPL-MCNC: 1.04 MG/DL (ref 0.6–1.3)
CREAT UR-MCNC: 154 MG/DL
EOSINOPHIL # BLD AUTO: 0.23 THOUSAND/ΜL (ref 0–0.61)
EOSINOPHIL NFR BLD AUTO: 4 % (ref 0–6)
ERYTHROCYTE [DISTWIDTH] IN BLOOD BY AUTOMATED COUNT: 12.3 % (ref 11.6–15.1)
EST. AVERAGE GLUCOSE BLD GHB EST-MCNC: 160 MG/DL
GFR SERPL CREATININE-BSD FRML MDRD: 79 ML/MIN/1.73SQ M
GLUCOSE P FAST SERPL-MCNC: 100 MG/DL (ref 65–99)
GLUCOSE UR STRIP-MCNC: NEGATIVE MG/DL
HBA1C MFR BLD: 7.2 % (ref 4.2–6.3)
HCT VFR BLD AUTO: 45.8 % (ref 36.5–49.3)
HGB BLD-MCNC: 15.1 G/DL (ref 12–17)
HGB UR QL STRIP.AUTO: NEGATIVE
IMM GRANULOCYTES # BLD AUTO: 0.03 THOUSAND/UL (ref 0–0.2)
IMM GRANULOCYTES NFR BLD AUTO: 1 % (ref 0–2)
KETONES UR STRIP-MCNC: NEGATIVE MG/DL
LEUKOCYTE ESTERASE UR QL STRIP: NEGATIVE
LYMPHOCYTES # BLD AUTO: 1.88 THOUSANDS/ΜL (ref 0.6–4.47)
LYMPHOCYTES NFR BLD AUTO: 31 % (ref 14–44)
MAGNESIUM SERPL-MCNC: 2.4 MG/DL (ref 1.6–2.6)
MCH RBC QN AUTO: 30.5 PG (ref 26.8–34.3)
MCHC RBC AUTO-ENTMCNC: 33 G/DL (ref 31.4–37.4)
MCV RBC AUTO: 93 FL (ref 82–98)
MICROALBUMIN UR-MCNC: <5 MG/L (ref 0–20)
MICROALBUMIN/CREAT 24H UR: <3 MG/G CREATININE (ref 0–30)
MONOCYTES # BLD AUTO: 0.71 THOUSAND/ΜL (ref 0.17–1.22)
MONOCYTES NFR BLD AUTO: 12 % (ref 4–12)
NEUTROPHILS # BLD AUTO: 3.17 THOUSANDS/ΜL (ref 1.85–7.62)
NEUTS SEG NFR BLD AUTO: 51 % (ref 43–75)
NITRITE UR QL STRIP: NEGATIVE
NRBC BLD AUTO-RTO: 0 /100 WBCS
PH UR STRIP.AUTO: 7.5 [PH]
PHOSPHATE SERPL-MCNC: 3.2 MG/DL (ref 2.7–4.5)
PLATELET # BLD AUTO: 302 THOUSANDS/UL (ref 149–390)
PMV BLD AUTO: 10.9 FL (ref 8.9–12.7)
POTASSIUM SERPL-SCNC: 4.3 MMOL/L (ref 3.5–5.3)
PROT SERPL-MCNC: 7 G/DL (ref 6.4–8.2)
PROT UR STRIP-MCNC: NEGATIVE MG/DL
RBC # BLD AUTO: 4.95 MILLION/UL (ref 3.88–5.62)
SODIUM SERPL-SCNC: 139 MMOL/L (ref 136–145)
SP GR UR STRIP.AUTO: 1.02 (ref 1–1.03)
T4 FREE SERPL-MCNC: 1.16 NG/DL (ref 0.76–1.46)
TSH SERPL DL<=0.05 MIU/L-ACNC: 0.08 UIU/ML (ref 0.36–3.74)
UROBILINOGEN UR QL STRIP.AUTO: 0.2 E.U./DL
WBC # BLD AUTO: 6.1 THOUSAND/UL (ref 4.31–10.16)

## 2019-03-09 PROCEDURE — 83036 HEMOGLOBIN GLYCOSYLATED A1C: CPT

## 2019-03-09 PROCEDURE — 85025 COMPLETE CBC W/AUTO DIFF WBC: CPT

## 2019-03-09 PROCEDURE — 82570 ASSAY OF URINE CREATININE: CPT | Performed by: SPECIALIST

## 2019-03-09 PROCEDURE — 86800 THYROGLOBULIN ANTIBODY: CPT

## 2019-03-09 PROCEDURE — 82043 UR ALBUMIN QUANTITATIVE: CPT | Performed by: SPECIALIST

## 2019-03-09 PROCEDURE — 84100 ASSAY OF PHOSPHORUS: CPT

## 2019-03-09 PROCEDURE — 84443 ASSAY THYROID STIM HORMONE: CPT

## 2019-03-09 PROCEDURE — 84432 ASSAY OF THYROGLOBULIN: CPT

## 2019-03-09 PROCEDURE — 84439 ASSAY OF FREE THYROXINE: CPT

## 2019-03-09 PROCEDURE — 83735 ASSAY OF MAGNESIUM: CPT

## 2019-03-09 PROCEDURE — 81003 URINALYSIS AUTO W/O SCOPE: CPT | Performed by: SPECIALIST

## 2019-03-09 PROCEDURE — 36415 COLL VENOUS BLD VENIPUNCTURE: CPT

## 2019-03-09 PROCEDURE — 80053 COMPREHEN METABOLIC PANEL: CPT

## 2019-03-12 LAB
THYROGLOB AB SERPL-ACNC: <1 IU/ML (ref 0–0.9)
THYROGLOB SERPL-MCNC: <0.1 NG/ML (ref 1.4–29.2)

## 2019-09-21 ENCOUNTER — TRANSCRIBE ORDERS (OUTPATIENT)
Dept: ADMINISTRATIVE | Age: 59
End: 2019-09-21

## 2019-09-21 ENCOUNTER — APPOINTMENT (OUTPATIENT)
Dept: LAB | Age: 59
End: 2019-09-21
Payer: COMMERCIAL

## 2019-09-21 DIAGNOSIS — E10.8 TYPE 1 DIABETES MELLITUS WITH COMPLICATION (HCC): ICD-10-CM

## 2019-09-21 DIAGNOSIS — E03.9 ACQUIRED HYPOTHYROIDISM: ICD-10-CM

## 2019-09-21 DIAGNOSIS — E89.0 POST-OPERATIVE HYPOTHYROIDISM: ICD-10-CM

## 2019-09-21 DIAGNOSIS — C73 THYROID CANCER (HCC): Primary | ICD-10-CM

## 2019-09-21 DIAGNOSIS — C73 THYROID CANCER (HCC): ICD-10-CM

## 2019-09-21 LAB
ALBUMIN SERPL BCP-MCNC: 3.7 G/DL (ref 3.5–5)
ALP SERPL-CCNC: 86 U/L (ref 46–116)
ALT SERPL W P-5'-P-CCNC: 47 U/L (ref 12–78)
ANION GAP SERPL CALCULATED.3IONS-SCNC: 1 MMOL/L (ref 4–13)
AST SERPL W P-5'-P-CCNC: 29 U/L (ref 5–45)
BASOPHILS # BLD AUTO: 0.08 THOUSANDS/ΜL (ref 0–0.1)
BASOPHILS NFR BLD AUTO: 1 % (ref 0–1)
BILIRUB SERPL-MCNC: 0.87 MG/DL (ref 0.2–1)
BILIRUB UR QL STRIP: NEGATIVE
BUN SERPL-MCNC: 23 MG/DL (ref 5–25)
CALCIUM SERPL-MCNC: 8.8 MG/DL (ref 8.3–10.1)
CHLORIDE SERPL-SCNC: 104 MMOL/L (ref 100–108)
CLARITY UR: CLEAR
CO2 SERPL-SCNC: 32 MMOL/L (ref 21–32)
COLOR UR: YELLOW
CREAT SERPL-MCNC: 1 MG/DL (ref 0.6–1.3)
EOSINOPHIL # BLD AUTO: 0.2 THOUSAND/ΜL (ref 0–0.61)
EOSINOPHIL NFR BLD AUTO: 4 % (ref 0–6)
ERYTHROCYTE [DISTWIDTH] IN BLOOD BY AUTOMATED COUNT: 12.6 % (ref 11.6–15.1)
EST. AVERAGE GLUCOSE BLD GHB EST-MCNC: 140 MG/DL
GFR SERPL CREATININE-BSD FRML MDRD: 83 ML/MIN/1.73SQ M
GLUCOSE P FAST SERPL-MCNC: 119 MG/DL (ref 65–99)
GLUCOSE UR STRIP-MCNC: NEGATIVE MG/DL
HBA1C MFR BLD: 6.5 % (ref 4.2–6.3)
HCT VFR BLD AUTO: 44 % (ref 36.5–49.3)
HGB BLD-MCNC: 14.5 G/DL (ref 12–17)
HGB UR QL STRIP.AUTO: NEGATIVE
IMM GRANULOCYTES # BLD AUTO: 0.03 THOUSAND/UL (ref 0–0.2)
IMM GRANULOCYTES NFR BLD AUTO: 1 % (ref 0–2)
KETONES UR STRIP-MCNC: NEGATIVE MG/DL
LEUKOCYTE ESTERASE UR QL STRIP: NEGATIVE
LYMPHOCYTES # BLD AUTO: 1.91 THOUSANDS/ΜL (ref 0.6–4.47)
LYMPHOCYTES NFR BLD AUTO: 34 % (ref 14–44)
MAGNESIUM SERPL-MCNC: 2.2 MG/DL (ref 1.6–2.6)
MCH RBC QN AUTO: 30.1 PG (ref 26.8–34.3)
MCHC RBC AUTO-ENTMCNC: 33 G/DL (ref 31.4–37.4)
MCV RBC AUTO: 92 FL (ref 82–98)
MONOCYTES # BLD AUTO: 0.69 THOUSAND/ΜL (ref 0.17–1.22)
MONOCYTES NFR BLD AUTO: 12 % (ref 4–12)
NEUTROPHILS # BLD AUTO: 2.72 THOUSANDS/ΜL (ref 1.85–7.62)
NEUTS SEG NFR BLD AUTO: 48 % (ref 43–75)
NITRITE UR QL STRIP: NEGATIVE
NRBC BLD AUTO-RTO: 0 /100 WBCS
PH UR STRIP.AUTO: 7.5 [PH]
PHOSPHATE SERPL-MCNC: 2.7 MG/DL (ref 2.7–4.5)
PLATELET # BLD AUTO: 275 THOUSANDS/UL (ref 149–390)
PMV BLD AUTO: 10.8 FL (ref 8.9–12.7)
POTASSIUM SERPL-SCNC: 4.3 MMOL/L (ref 3.5–5.3)
PROT SERPL-MCNC: 6.8 G/DL (ref 6.4–8.2)
PROT UR STRIP-MCNC: NEGATIVE MG/DL
RBC # BLD AUTO: 4.81 MILLION/UL (ref 3.88–5.62)
SODIUM SERPL-SCNC: 137 MMOL/L (ref 136–145)
SP GR UR STRIP.AUTO: 1.02 (ref 1–1.03)
T4 FREE SERPL-MCNC: 1.24 NG/DL (ref 0.76–1.46)
TSH SERPL DL<=0.05 MIU/L-ACNC: 0.02 UIU/ML (ref 0.36–3.74)
UROBILINOGEN UR QL STRIP.AUTO: 0.2 E.U./DL
WBC # BLD AUTO: 5.63 THOUSAND/UL (ref 4.31–10.16)

## 2019-09-21 PROCEDURE — 36415 COLL VENOUS BLD VENIPUNCTURE: CPT

## 2019-09-21 PROCEDURE — 84439 ASSAY OF FREE THYROXINE: CPT

## 2019-09-21 PROCEDURE — 84443 ASSAY THYROID STIM HORMONE: CPT

## 2019-09-21 PROCEDURE — 85025 COMPLETE CBC W/AUTO DIFF WBC: CPT

## 2019-09-21 PROCEDURE — 84432 ASSAY OF THYROGLOBULIN: CPT

## 2019-09-21 PROCEDURE — 80053 COMPREHEN METABOLIC PANEL: CPT

## 2019-09-21 PROCEDURE — 81003 URINALYSIS AUTO W/O SCOPE: CPT | Performed by: SPECIALIST

## 2019-09-21 PROCEDURE — 84100 ASSAY OF PHOSPHORUS: CPT

## 2019-09-21 PROCEDURE — 83735 ASSAY OF MAGNESIUM: CPT

## 2019-09-21 PROCEDURE — 83036 HEMOGLOBIN GLYCOSYLATED A1C: CPT

## 2019-09-21 PROCEDURE — 86800 THYROGLOBULIN ANTIBODY: CPT

## 2019-09-24 LAB
THYROGLOB AB SERPL-ACNC: <1 IU/ML (ref 0–0.9)
THYROGLOB SERPL-MCNC: <0.1 NG/ML (ref 1.4–29.2)

## 2019-10-30 ENCOUNTER — TELEPHONE (OUTPATIENT)
Dept: UROLOGY | Facility: MEDICAL CENTER | Age: 59
End: 2019-10-30

## 2019-10-30 DIAGNOSIS — R97.20 ELEVATED PSA: Primary | ICD-10-CM

## 2019-10-30 NOTE — TELEPHONE ENCOUNTER
Patient of Dr Kacie Pratt seen in [facility] office  Patient has 1 year follow up coming up and is requesting updated psa order  Patient goes to [address] [place] [address]

## 2019-11-25 ENCOUNTER — APPOINTMENT (OUTPATIENT)
Dept: LAB | Age: 59
End: 2019-11-25
Payer: COMMERCIAL

## 2019-11-25 DIAGNOSIS — R97.20 ELEVATED PSA: ICD-10-CM

## 2019-11-25 LAB — PSA SERPL-MCNC: 1.2 NG/ML (ref 0–4)

## 2019-11-25 PROCEDURE — 84153 ASSAY OF PSA TOTAL: CPT

## 2019-11-26 ENCOUNTER — OFFICE VISIT (OUTPATIENT)
Dept: UROLOGY | Facility: AMBULATORY SURGERY CENTER | Age: 59
End: 2019-11-26
Payer: COMMERCIAL

## 2019-11-26 VITALS
DIASTOLIC BLOOD PRESSURE: 60 MMHG | HEIGHT: 75 IN | BODY MASS INDEX: 26.36 KG/M2 | WEIGHT: 212 LBS | HEART RATE: 65 BPM | SYSTOLIC BLOOD PRESSURE: 100 MMHG

## 2019-11-26 DIAGNOSIS — C61 PROSTATE CANCER (HCC): Primary | ICD-10-CM

## 2019-11-26 PROCEDURE — 99213 OFFICE O/P EST LOW 20 MIN: CPT | Performed by: NURSE PRACTITIONER

## 2019-11-26 NOTE — PROGRESS NOTES
11/26/2019      Chief Complaint   Patient presents with    Prostate cancer screening     psa 1 2    11/25/2019     Assessment and Plan    63 y o  male managed by Dr Lizette Caicedo    1  Routine Prostate Cancer Screening  · PSA performed 11-25-19 resulted as 1 2  · CAROL reveals a prostate size of approximately 40 g no nodules noted  · Repeat PSA and CAROL in 1 year    2  Erectile Dysfunction  · Used trimix in the past- currenly has no need to continue at this time    3  Benign Prostatic Hyperplasia  · Bladder Scan PVR and Uroflow at next office   · Follow up in the office in 1 year    History of Present Illness  Nahomy Wynn is a 63 y o  male here for follow up evaluation of urinary symptoms secondary to benign prostatic hyperplasia, routine prostate cancer screening and erectile dysfunction  Patient reports using Tri Mix in the past but currently has no need to continue at this time and wishes to defer any further erectile dysfunction interventions  Patient currently denies all lower urinary tract symptoms including dysuria, hematuria, urinary frequency and urgency  Reports sensation of complete bladder emptying with urination  Reports his urinary stream to be good  Patient's most recent PSA performed 11/25/2019 resulted as 1 2  He reports his general health to be well controlled with no significant complaints at this time  Component       PSA, Total   Latest Ref Rng & Units       0 0 - 4 0 ng/mL   2/12/2018      2:12 PM 2 0   11/25/2019      5:53 PM 1 2     PSA resulted 1 7 performed in 2017  PSA resulted 2 4 performed in 2016    Review of Systems   Constitutional: Negative for chills and fever  Respiratory: Negative for cough and shortness of breath  Cardiovascular: Negative for chest pain  Gastrointestinal: Negative for abdominal distention, abdominal pain, blood in stool, nausea and vomiting     Genitourinary: Negative for difficulty urinating, dysuria, enuresis, flank pain, frequency, hematuria and urgency  Skin: Negative for rash  Past Medical History  Past Medical History:   Diagnosis Date    Cancer ([facility] [address] )     thyroid    [facility]     Disease of thyroid gland        Past Social History  Past Surgical History:   Procedure Laterality Date    [place] COLONOSCOPY FLX DX W/COLLJ SPEC WHEN PFRMD N/A 10/25/2017    Procedure: COLONOSCOPY;  Surgeon: Eladia Gillette MD;  Location: [facility]; Service: Gastroenterology     Social History     Tobacco Use   Smoking Status Never Smoker   Smokeless Tobacco Never Used       Past Family History  History reviewed  No pertinent family history      Past Social history  Social History     Socioeconomic History    Marital status:      Spouse name: Not on file    Number of children: Not on file    Years of education: Not on file    Highest education level: Not on file   Occupational History    Not on file   Social Needs    Financial resource strain: Not on file    Food insecurity:     Worry: Not on file     Inability: Not on file    Transportation needs:     Medical: Not on file     Non-medical: Not on file   Tobacco Use    Smoking status: Never Smoker    Smokeless tobacco: Never Used   Substance and Sexual Activity    Alcohol use: Yes     Comment: rarely    Drug use: No    Sexual activity: Not on file   Lifestyle    Physical activity:     Days per week: Not on file     Minutes per session: Not on file    Stress: Not on file   Relationships    Social connections:     Talks on phone: Not on file     Gets together: Not on file     Attends [employer] service: Not on file     Active member of club or organization: Not on file     Attends meetings of clubs or organizations: Not on file     Relationship status: Not on file    Intimate partner violence:     Fear of current or ex partner: Not on file     Emotionally abused: Not on file     Physically abused: Not on file     Forced sexual activity: Not on file   Other Topics Concern    Not on file Social History Narrative    Not on file       Current Medications  Current Outpatient Medications   Medication Sig Dispense Refill    insulin lispro (HumaLOG) 100 units/mL injection Inject 55 Units under the skin continuous      levothyroxine 175 mcg tablet Take 175 mcg by mouth daily      PEG 3350-KCl-NaCl-NaSulf-Na Asc-C (MOVIPREP) 100 g Take by mouth      azithromycin (ZITHROMAX) 500 MG tablet       loratadine (CLARITIN) 10 mg tablet Take 10 mg by mouth daily      montelukast (SINGULAIR) 10 mg tablet        No current facility-administered medications for this visit  Allergies  Allergies   Allergen Reactions    Penicillins Hives         The following portions of the patient's history were reviewed and updated as appropriate: allergies, current medications, past medical history, past social history, past surgical history and problem list       Vitals  Vitals:    11/26/19 1517   BP: 100/60   BP Location: Left arm   Patient Position: Sitting   Cuff Size: Adult   Pulse: 65   Weight: 96 2 kg (212 lb)   Height: 6' 3" (1 905 m)     Physical Exam  Physical Exam   Constitutional: He is oriented to person, place, and time  He appears well-developed and well-nourished  No distress  HENT:   Head: Atraumatic  Cardiovascular: Normal rate  Pulmonary/Chest: Effort normal    Abdominal: Soft  Genitourinary: Testes normal and penis normal    Genitourinary Comments: Prostate noted to be approximately 40 g no nodularity noted   Musculoskeletal: Normal range of motion  Neurological: He is alert and oriented to person, place, and time  Skin: Skin is warm and dry  Psychiatric: He has a normal mood and affect  Vitals reviewed      Results  No results found for this or any previous visit (from the past 1 hour(s)) ]  Lab Results   Component Value Date    PSA 1 2 11/25/2019    PSA 2 0 02/12/2018    PSA 1 8 10/14/2014     Lab Results   Component Value Date    GLUCOSE 124 10/14/2014    CALCIUM 8 8 09/21/2019  10/14/2014    K 4 3 09/21/2019    CO2 32 09/21/2019     09/21/2019    BUN 23 09/21/2019    CREATININE 1 00 09/21/2019     Lab Results   Component Value Date    WBC 5 63 09/21/2019    HGB 14 5 09/21/2019    HCT 44 0 09/21/2019    MCV 92 09/21/2019     09/21/2019     Orders  Orders Placed This Encounter   Procedures    PSA, Total Screen     This is a patient instruction: This test is non-fasting  Please drink two glasses of water morning of bloodwork          Standing Status:   Future     Standing Expiration Date:   5/26/2021       KERI Gonzalez

## 2019-11-26 NOTE — PATIENT INSTRUCTIONS
Repeat PSA in 1 year  Santiago the office for concerning symptoms- urinary frequency, urgency, or incomplete bladder emptying

## 2020-07-14 ENCOUNTER — APPOINTMENT (OUTPATIENT)
Dept: LAB | Age: 60
End: 2020-07-14
Payer: COMMERCIAL

## 2020-07-14 ENCOUNTER — TRANSCRIBE ORDERS (OUTPATIENT)
Dept: ADMINISTRATIVE | Age: 60
End: 2020-07-14

## 2020-07-14 DIAGNOSIS — E03.9 ACQUIRED HYPOTHYROIDISM: Primary | ICD-10-CM

## 2020-07-14 DIAGNOSIS — E03.9 ACQUIRED HYPOTHYROIDISM: ICD-10-CM

## 2020-07-14 DIAGNOSIS — C73 THYROID CANCER (HCC): ICD-10-CM

## 2020-07-14 LAB
ALBUMIN SERPL BCP-MCNC: 3.8 G/DL (ref 3.5–5)
ALP SERPL-CCNC: 90 U/L (ref 46–116)
ALT SERPL W P-5'-P-CCNC: 36 U/L (ref 12–78)
ANION GAP SERPL CALCULATED.3IONS-SCNC: 3 MMOL/L (ref 4–13)
AST SERPL W P-5'-P-CCNC: 21 U/L (ref 5–45)
BASOPHILS # BLD AUTO: 0.05 THOUSANDS/ΜL (ref 0–0.1)
BASOPHILS NFR BLD AUTO: 1 % (ref 0–1)
BILIRUB SERPL-MCNC: 0.83 MG/DL (ref 0.2–1)
BILIRUB UR QL STRIP: NEGATIVE
BUN SERPL-MCNC: 17 MG/DL (ref 5–25)
CALCIUM SERPL-MCNC: 9.1 MG/DL (ref 8.3–10.1)
CHLORIDE SERPL-SCNC: 103 MMOL/L (ref 100–108)
CLARITY UR: CLEAR
CO2 SERPL-SCNC: 30 MMOL/L (ref 21–32)
COLOR UR: YELLOW
CREAT SERPL-MCNC: 0.92 MG/DL (ref 0.6–1.3)
CREAT UR-MCNC: 97.4 MG/DL
EOSINOPHIL # BLD AUTO: 0.13 THOUSAND/ΜL (ref 0–0.61)
EOSINOPHIL NFR BLD AUTO: 2 % (ref 0–6)
ERYTHROCYTE [DISTWIDTH] IN BLOOD BY AUTOMATED COUNT: 12.9 % (ref 11.6–15.1)
EST. AVERAGE GLUCOSE BLD GHB EST-MCNC: 140 MG/DL
GFR SERPL CREATININE-BSD FRML MDRD: 91 ML/MIN/1.73SQ M
GLUCOSE P FAST SERPL-MCNC: 239 MG/DL (ref 65–99)
GLUCOSE UR STRIP-MCNC: ABNORMAL MG/DL
HBA1C MFR BLD: 6.5 %
HCT VFR BLD AUTO: 48.3 % (ref 36.5–49.3)
HGB BLD-MCNC: 16 G/DL (ref 12–17)
HGB UR QL STRIP.AUTO: NEGATIVE
IMM GRANULOCYTES # BLD AUTO: 0.03 THOUSAND/UL (ref 0–0.2)
IMM GRANULOCYTES NFR BLD AUTO: 1 % (ref 0–2)
KETONES UR STRIP-MCNC: ABNORMAL MG/DL
LEUKOCYTE ESTERASE UR QL STRIP: NEGATIVE
LYMPHOCYTES # BLD AUTO: 1.43 THOUSANDS/ΜL (ref 0.6–4.47)
LYMPHOCYTES NFR BLD AUTO: 27 % (ref 14–44)
MAGNESIUM SERPL-MCNC: 2.5 MG/DL (ref 1.6–2.6)
MCH RBC QN AUTO: 31.1 PG (ref 26.8–34.3)
MCHC RBC AUTO-ENTMCNC: 33.1 G/DL (ref 31.4–37.4)
MCV RBC AUTO: 94 FL (ref 82–98)
MICROALBUMIN UR-MCNC: 5.9 MG/L (ref 0–20)
MICROALBUMIN/CREAT 24H UR: 6 MG/G CREATININE (ref 0–30)
MONOCYTES # BLD AUTO: 0.51 THOUSAND/ΜL (ref 0.17–1.22)
MONOCYTES NFR BLD AUTO: 10 % (ref 4–12)
NEUTROPHILS # BLD AUTO: 3.23 THOUSANDS/ΜL (ref 1.85–7.62)
NEUTS SEG NFR BLD AUTO: 59 % (ref 43–75)
NITRITE UR QL STRIP: NEGATIVE
NRBC BLD AUTO-RTO: 0 /100 WBCS
PH UR STRIP.AUTO: 6 [PH]
PHOSPHATE SERPL-MCNC: 3.2 MG/DL (ref 2.7–4.5)
PLATELET # BLD AUTO: 284 THOUSANDS/UL (ref 149–390)
PMV BLD AUTO: 11 FL (ref 8.9–12.7)
POTASSIUM SERPL-SCNC: 4.9 MMOL/L (ref 3.5–5.3)
PROT SERPL-MCNC: 7.7 G/DL (ref 6.4–8.2)
PROT UR STRIP-MCNC: NEGATIVE MG/DL
RBC # BLD AUTO: 5.15 MILLION/UL (ref 3.88–5.62)
SODIUM SERPL-SCNC: 136 MMOL/L (ref 136–145)
SP GR UR STRIP.AUTO: 1.02 (ref 1–1.03)
T4 FREE SERPL-MCNC: 1.42 NG/DL (ref 0.76–1.46)
TSH SERPL DL<=0.05 MIU/L-ACNC: 0.03 UIU/ML (ref 0.36–3.74)
UROBILINOGEN UR QL STRIP.AUTO: 0.2 E.U./DL
WBC # BLD AUTO: 5.38 THOUSAND/UL (ref 4.31–10.16)

## 2020-07-14 PROCEDURE — 84100 ASSAY OF PHOSPHORUS: CPT

## 2020-07-14 PROCEDURE — 86800 THYROGLOBULIN ANTIBODY: CPT

## 2020-07-14 PROCEDURE — 84443 ASSAY THYROID STIM HORMONE: CPT

## 2020-07-14 PROCEDURE — 83735 ASSAY OF MAGNESIUM: CPT

## 2020-07-14 PROCEDURE — 81003 URINALYSIS AUTO W/O SCOPE: CPT | Performed by: SPECIALIST

## 2020-07-14 PROCEDURE — 80053 COMPREHEN METABOLIC PANEL: CPT

## 2020-07-14 PROCEDURE — 83036 HEMOGLOBIN GLYCOSYLATED A1C: CPT

## 2020-07-14 PROCEDURE — 82570 ASSAY OF URINE CREATININE: CPT | Performed by: SPECIALIST

## 2020-07-14 PROCEDURE — 36415 COLL VENOUS BLD VENIPUNCTURE: CPT

## 2020-07-14 PROCEDURE — 84439 ASSAY OF FREE THYROXINE: CPT

## 2020-07-14 PROCEDURE — 84432 ASSAY OF THYROGLOBULIN: CPT

## 2020-07-14 PROCEDURE — 85025 COMPLETE CBC W/AUTO DIFF WBC: CPT

## 2020-07-14 PROCEDURE — 82043 UR ALBUMIN QUANTITATIVE: CPT | Performed by: SPECIALIST

## 2020-07-15 LAB
THYROGLOB AB SERPL-ACNC: <1 IU/ML (ref 0–0.9)
THYROGLOB SERPL-MCNC: <0.1 NG/ML (ref 1.4–29.2)

## 2020-11-04 ENCOUNTER — LAB (OUTPATIENT)
Dept: LAB | Age: 60
End: 2020-11-04
Payer: COMMERCIAL

## 2020-11-04 ENCOUNTER — TRANSCRIBE ORDERS (OUTPATIENT)
Dept: ADMINISTRATIVE | Age: 60
End: 2020-11-04

## 2020-11-04 DIAGNOSIS — E03.9 MYXEDEMA HEART DISEASE: ICD-10-CM

## 2020-11-04 DIAGNOSIS — C73 MALIGNANT NEOPLASM OF THYROID GLAND (HCC): ICD-10-CM

## 2020-11-04 DIAGNOSIS — E10.69 TYPE 1 DIABETES MELLITUS WITH OTHER SPECIFIED COMPLICATION (HCC): ICD-10-CM

## 2020-11-04 DIAGNOSIS — E89.0 POSTSURGICAL HYPOTHYROIDISM: ICD-10-CM

## 2020-11-04 DIAGNOSIS — I51.9 MYXEDEMA HEART DISEASE: ICD-10-CM

## 2020-11-04 DIAGNOSIS — C61 PROSTATE CANCER (HCC): ICD-10-CM

## 2020-11-04 DIAGNOSIS — C73 MALIGNANT NEOPLASM OF THYROID GLAND (HCC): Primary | ICD-10-CM

## 2020-11-04 LAB
ALBUMIN SERPL BCP-MCNC: 3.7 G/DL (ref 3.5–5)
ALP SERPL-CCNC: 92 U/L (ref 46–116)
ALT SERPL W P-5'-P-CCNC: 62 U/L (ref 12–78)
ANION GAP SERPL CALCULATED.3IONS-SCNC: -1 MMOL/L (ref 4–13)
AST SERPL W P-5'-P-CCNC: 30 U/L (ref 5–45)
BACTERIA UR QL AUTO: NORMAL /HPF
BASOPHILS # BLD AUTO: 0.07 THOUSANDS/ΜL (ref 0–0.1)
BASOPHILS NFR BLD AUTO: 1 % (ref 0–1)
BILIRUB SERPL-MCNC: 0.55 MG/DL (ref 0.2–1)
BILIRUB UR QL STRIP: NEGATIVE
BUN SERPL-MCNC: 18 MG/DL (ref 5–25)
CALCIUM SERPL-MCNC: 9.3 MG/DL (ref 8.3–10.1)
CHLORIDE SERPL-SCNC: 106 MMOL/L (ref 100–108)
CLARITY UR: CLEAR
CO2 SERPL-SCNC: 34 MMOL/L (ref 21–32)
COLOR UR: YELLOW
CORTIS AM PEAK SERPL-MCNC: 22 UG/DL (ref 4.2–22.4)
CREAT SERPL-MCNC: 1.04 MG/DL (ref 0.6–1.3)
CREAT UR-MCNC: 232 MG/DL
EOSINOPHIL # BLD AUTO: 0.19 THOUSAND/ΜL (ref 0–0.61)
EOSINOPHIL NFR BLD AUTO: 3 % (ref 0–6)
ERYTHROCYTE [DISTWIDTH] IN BLOOD BY AUTOMATED COUNT: 12.6 % (ref 11.6–15.1)
EST. AVERAGE GLUCOSE BLD GHB EST-MCNC: 143 MG/DL
FOLATE SERPL-MCNC: 15.1 NG/ML (ref 3.1–17.5)
GFR SERPL CREATININE-BSD FRML MDRD: 78 ML/MIN/1.73SQ M
GLUCOSE P FAST SERPL-MCNC: 183 MG/DL (ref 65–99)
GLUCOSE UR STRIP-MCNC: NEGATIVE MG/DL
HBA1C MFR BLD: 6.6 %
HCT VFR BLD AUTO: 44.9 % (ref 36.5–49.3)
HGB BLD-MCNC: 15.1 G/DL (ref 12–17)
HGB UR QL STRIP.AUTO: NEGATIVE
IMM GRANULOCYTES # BLD AUTO: 0.03 THOUSAND/UL (ref 0–0.2)
IMM GRANULOCYTES NFR BLD AUTO: 1 % (ref 0–2)
KETONES UR STRIP-MCNC: NEGATIVE MG/DL
LEUKOCYTE ESTERASE UR QL STRIP: NEGATIVE
LYMPHOCYTES # BLD AUTO: 1.51 THOUSANDS/ΜL (ref 0.6–4.47)
LYMPHOCYTES NFR BLD AUTO: 26 % (ref 14–44)
MAGNESIUM SERPL-MCNC: 2.4 MG/DL (ref 1.6–2.6)
MCH RBC QN AUTO: 31.1 PG (ref 26.8–34.3)
MCHC RBC AUTO-ENTMCNC: 33.6 G/DL (ref 31.4–37.4)
MCV RBC AUTO: 92 FL (ref 82–98)
MICROALBUMIN UR-MCNC: 8.1 MG/L (ref 0–20)
MICROALBUMIN/CREAT 24H UR: 3 MG/G CREATININE (ref 0–30)
MONOCYTES # BLD AUTO: 0.63 THOUSAND/ΜL (ref 0.17–1.22)
MONOCYTES NFR BLD AUTO: 11 % (ref 4–12)
NEUTROPHILS # BLD AUTO: 3.33 THOUSANDS/ΜL (ref 1.85–7.62)
NEUTS SEG NFR BLD AUTO: 58 % (ref 43–75)
NITRITE UR QL STRIP: NEGATIVE
NON-SQ EPI CELLS URNS QL MICRO: NORMAL /HPF
NRBC BLD AUTO-RTO: 0 /100 WBCS
PH UR STRIP.AUTO: 6 [PH]
PHOSPHATE SERPL-MCNC: 3.3 MG/DL (ref 2.7–4.5)
PLATELET # BLD AUTO: 276 THOUSANDS/UL (ref 149–390)
PMV BLD AUTO: 10.3 FL (ref 8.9–12.7)
POTASSIUM SERPL-SCNC: 4.8 MMOL/L (ref 3.5–5.3)
PROT SERPL-MCNC: 7.1 G/DL (ref 6.4–8.2)
PROT UR STRIP-MCNC: NEGATIVE MG/DL
PSA SERPL-MCNC: 1.3 NG/ML (ref 0–4)
RBC # BLD AUTO: 4.86 MILLION/UL (ref 3.88–5.62)
RBC #/AREA URNS AUTO: NORMAL /HPF
SODIUM SERPL-SCNC: 139 MMOL/L (ref 136–145)
SP GR UR STRIP.AUTO: 1.03 (ref 1–1.03)
T4 FREE SERPL-MCNC: 1.26 NG/DL (ref 0.76–1.46)
TSH SERPL DL<=0.05 MIU/L-ACNC: 0.1 UIU/ML (ref 0.36–3.74)
UROBILINOGEN UR QL STRIP.AUTO: 0.2 E.U./DL
VIT B12 SERPL-MCNC: 865 PG/ML (ref 100–900)
WBC # BLD AUTO: 5.76 THOUSAND/UL (ref 4.31–10.16)
WBC #/AREA URNS AUTO: NORMAL /HPF

## 2020-11-04 PROCEDURE — 82533 TOTAL CORTISOL: CPT

## 2020-11-04 PROCEDURE — 84439 ASSAY OF FREE THYROXINE: CPT

## 2020-11-04 PROCEDURE — 81001 URINALYSIS AUTO W/SCOPE: CPT | Performed by: SPECIALIST

## 2020-11-04 PROCEDURE — 83036 HEMOGLOBIN GLYCOSYLATED A1C: CPT

## 2020-11-04 PROCEDURE — 84100 ASSAY OF PHOSPHORUS: CPT

## 2020-11-04 PROCEDURE — 80053 COMPREHEN METABOLIC PANEL: CPT

## 2020-11-04 PROCEDURE — 86255 FLUORESCENT ANTIBODY SCREEN: CPT

## 2020-11-04 PROCEDURE — 82746 ASSAY OF FOLIC ACID SERUM: CPT

## 2020-11-04 PROCEDURE — 82607 VITAMIN B-12: CPT

## 2020-11-04 PROCEDURE — 83516 IMMUNOASSAY NONANTIBODY: CPT

## 2020-11-04 PROCEDURE — 83835 ASSAY OF METANEPHRINES: CPT

## 2020-11-04 PROCEDURE — 82043 UR ALBUMIN QUANTITATIVE: CPT | Performed by: SPECIALIST

## 2020-11-04 PROCEDURE — G0103 PSA SCREENING: HCPCS

## 2020-11-04 PROCEDURE — 82024 ASSAY OF ACTH: CPT

## 2020-11-04 PROCEDURE — 85025 COMPLETE CBC W/AUTO DIFF WBC: CPT

## 2020-11-04 PROCEDURE — 82784 ASSAY IGA/IGD/IGG/IGM EACH: CPT

## 2020-11-04 PROCEDURE — 83735 ASSAY OF MAGNESIUM: CPT

## 2020-11-04 PROCEDURE — 84443 ASSAY THYROID STIM HORMONE: CPT

## 2020-11-04 PROCEDURE — 36415 COLL VENOUS BLD VENIPUNCTURE: CPT

## 2020-11-04 PROCEDURE — 82570 ASSAY OF URINE CREATININE: CPT | Performed by: SPECIALIST

## 2020-11-05 ENCOUNTER — TRANSCRIBE ORDERS (OUTPATIENT)
Dept: ADMINISTRATIVE | Facility: HOSPITAL | Age: 60
End: 2020-11-05

## 2020-11-05 DIAGNOSIS — C73 MALIGNANT NEOPLASM OF THYROID GLAND (HCC): ICD-10-CM

## 2020-11-05 DIAGNOSIS — E03.2 HYPOTHYROIDISM DUE TO MEDICAMENTS AND OTHER EXOGENOUS SUBSTANCES: Primary | ICD-10-CM

## 2020-11-05 DIAGNOSIS — E10.9 TYPE 1 DIABETES MELLITUS WITHOUT COMPLICATIONS (HCC): ICD-10-CM

## 2020-11-05 LAB — ACTH PLAS-MCNC: 17.9 PG/ML (ref 7.2–63.3)

## 2020-11-06 ENCOUNTER — HOSPITAL ENCOUNTER (OUTPATIENT)
Dept: RADIOLOGY | Age: 60
Discharge: HOME | End: 2020-11-06
Payer: COMMERCIAL

## 2020-11-06 DIAGNOSIS — E03.9 HYPOTHYROIDISM, UNSPECIFIED TYPE: ICD-10-CM

## 2020-11-06 DIAGNOSIS — C73 THYROID CANCER (HCC): ICD-10-CM

## 2020-11-06 DIAGNOSIS — E10.9 TYPE 1 DIABETES MELLITUS WITHOUT COMPLICATION (HCC): ICD-10-CM

## 2020-11-06 LAB
ENDOMYSIUM IGA SER QL: POSITIVE
GLIADIN PEPTIDE IGA SER-ACNC: 75 UNITS (ref 0–19)
GLIADIN PEPTIDE IGG SER-ACNC: 21 UNITS (ref 0–19)
IGA SERPL-MCNC: 331 MG/DL (ref 90–386)
TTG IGA SER-ACNC: 22 U/ML (ref 0–3)
TTG IGG SER-ACNC: 3 U/ML (ref 0–5)

## 2020-11-06 PROCEDURE — 76536 US EXAM OF HEAD AND NECK: CPT

## 2020-11-11 LAB
METANEPH FREE SERPL-MCNC: 40.2 PG/ML (ref 0–88)
NORMETANEPHRINE SERPL-MCNC: 62.8 PG/ML (ref 0–136.8)

## 2020-11-12 ENCOUNTER — HOSPITAL ENCOUNTER (OUTPATIENT)
Dept: CT IMAGING | Facility: HOSPITAL | Age: 60
Discharge: HOME | End: 2020-11-12
Attending: SPECIALIST
Payer: COMMERCIAL

## 2020-11-12 DIAGNOSIS — E03.2 HYPOTHYROIDISM DUE TO MEDICAMENTS AND OTHER EXOGENOUS SUBSTANCES: ICD-10-CM

## 2020-11-12 DIAGNOSIS — C73 MALIGNANT NEOPLASM OF THYROID GLAND (HCC): ICD-10-CM

## 2020-11-12 DIAGNOSIS — E10.9 TYPE 1 DIABETES MELLITUS WITHOUT COMPLICATIONS (HCC): ICD-10-CM

## 2020-11-12 PROCEDURE — G1004 CDSM NDSC: HCPCS

## 2020-11-12 PROCEDURE — 75571 CT HRT W/O DYE W/CA TEST: CPT

## 2020-11-30 ENCOUNTER — OFFICE VISIT (OUTPATIENT)
Dept: UROLOGY | Facility: AMBULATORY SURGERY CENTER | Age: 60
End: 2020-11-30
Payer: COMMERCIAL

## 2020-11-30 VITALS
DIASTOLIC BLOOD PRESSURE: 68 MMHG | BODY MASS INDEX: 26 KG/M2 | WEIGHT: 208 LBS | SYSTOLIC BLOOD PRESSURE: 128 MMHG | HEART RATE: 74 BPM

## 2020-11-30 DIAGNOSIS — Z12.5 PROSTATE CANCER SCREENING: Primary | ICD-10-CM

## 2020-11-30 PROCEDURE — 99213 OFFICE O/P EST LOW 20 MIN: CPT | Performed by: NURSE PRACTITIONER

## 2021-03-10 DIAGNOSIS — Z23 ENCOUNTER FOR IMMUNIZATION: ICD-10-CM

## 2021-11-13 ENCOUNTER — APPOINTMENT (OUTPATIENT)
Dept: LAB | Age: 61
End: 2021-11-13
Payer: COMMERCIAL

## 2021-11-13 DIAGNOSIS — C73 MALIGNANT NEOPLASM OF THYROID GLAND (HCC): ICD-10-CM

## 2021-11-13 DIAGNOSIS — I51.9 MYXEDEMA HEART DISEASE: ICD-10-CM

## 2021-11-13 DIAGNOSIS — E03.2 IATROGENIC HYPOTHYROIDISM: ICD-10-CM

## 2021-11-13 DIAGNOSIS — Z12.5 PROSTATE CANCER SCREENING: ICD-10-CM

## 2021-11-13 DIAGNOSIS — E03.9 MYXEDEMA HEART DISEASE: ICD-10-CM

## 2021-11-13 DIAGNOSIS — E10.69 TYPE 1 DIABETES MELLITUS WITH OTHER SPECIFIED COMPLICATION (HCC): ICD-10-CM

## 2021-11-13 LAB
ALBUMIN SERPL BCP-MCNC: 3.4 G/DL (ref 3.5–5)
ALP SERPL-CCNC: 91 U/L (ref 46–116)
ALT SERPL W P-5'-P-CCNC: 40 U/L (ref 12–78)
ANION GAP SERPL CALCULATED.3IONS-SCNC: 4 MMOL/L (ref 4–13)
AST SERPL W P-5'-P-CCNC: 27 U/L (ref 5–45)
BASOPHILS # BLD AUTO: 0.09 THOUSANDS/ΜL (ref 0–0.1)
BASOPHILS NFR BLD AUTO: 2 % (ref 0–1)
BILIRUB SERPL-MCNC: 0.8 MG/DL (ref 0.2–1)
BILIRUB UR QL STRIP: NEGATIVE
BUN SERPL-MCNC: 19 MG/DL (ref 5–25)
CALCIUM ALBUM COR SERPL-MCNC: 10 MG/DL (ref 8.3–10.1)
CALCIUM SERPL-MCNC: 9.5 MG/DL (ref 8.3–10.1)
CHLORIDE SERPL-SCNC: 106 MMOL/L (ref 100–108)
CHOLEST SERPL-MCNC: 171 MG/DL (ref 50–200)
CLARITY UR: CLEAR
CO2 SERPL-SCNC: 28 MMOL/L (ref 21–32)
COLOR UR: YELLOW
CREAT SERPL-MCNC: 1.04 MG/DL (ref 0.6–1.3)
EOSINOPHIL # BLD AUTO: 0.19 THOUSAND/ΜL (ref 0–0.61)
EOSINOPHIL NFR BLD AUTO: 3 % (ref 0–6)
ERYTHROCYTE [DISTWIDTH] IN BLOOD BY AUTOMATED COUNT: 12.6 % (ref 11.6–15.1)
EST. AVERAGE GLUCOSE BLD GHB EST-MCNC: 137 MG/DL
GFR SERPL CREATININE-BSD FRML MDRD: 78 ML/MIN/1.73SQ M
GLUCOSE P FAST SERPL-MCNC: 157 MG/DL (ref 65–99)
GLUCOSE UR STRIP-MCNC: NEGATIVE MG/DL
HBA1C MFR BLD: 6.4 %
HCT VFR BLD AUTO: 45.5 % (ref 36.5–49.3)
HDLC SERPL-MCNC: 45 MG/DL
HGB BLD-MCNC: 15.4 G/DL (ref 12–17)
HGB UR QL STRIP.AUTO: NEGATIVE
IMM GRANULOCYTES # BLD AUTO: 0.04 THOUSAND/UL (ref 0–0.2)
IMM GRANULOCYTES NFR BLD AUTO: 1 % (ref 0–2)
KETONES UR STRIP-MCNC: NEGATIVE MG/DL
LDLC SERPL CALC-MCNC: 111 MG/DL (ref 0–100)
LEUKOCYTE ESTERASE UR QL STRIP: NEGATIVE
LYMPHOCYTES # BLD AUTO: 1.74 THOUSANDS/ΜL (ref 0.6–4.47)
LYMPHOCYTES NFR BLD AUTO: 32 % (ref 14–44)
MAGNESIUM SERPL-MCNC: 2.1 MG/DL (ref 1.6–2.6)
MCH RBC QN AUTO: 30.6 PG (ref 26.8–34.3)
MCHC RBC AUTO-ENTMCNC: 33.8 G/DL (ref 31.4–37.4)
MCV RBC AUTO: 91 FL (ref 82–98)
MONOCYTES # BLD AUTO: 0.53 THOUSAND/ΜL (ref 0.17–1.22)
MONOCYTES NFR BLD AUTO: 10 % (ref 4–12)
NEUTROPHILS # BLD AUTO: 2.94 THOUSANDS/ΜL (ref 1.85–7.62)
NEUTS SEG NFR BLD AUTO: 52 % (ref 43–75)
NITRITE UR QL STRIP: NEGATIVE
NONHDLC SERPL-MCNC: 126 MG/DL
NRBC BLD AUTO-RTO: 0 /100 WBCS
PH UR STRIP.AUTO: 7 [PH]
PHOSPHATE SERPL-MCNC: 3.2 MG/DL (ref 2.3–4.1)
PLATELET # BLD AUTO: 299 THOUSANDS/UL (ref 149–390)
PMV BLD AUTO: 10.2 FL (ref 8.9–12.7)
POTASSIUM SERPL-SCNC: 4.1 MMOL/L (ref 3.5–5.3)
PROT SERPL-MCNC: 7 G/DL (ref 6.4–8.2)
PROT UR STRIP-MCNC: NEGATIVE MG/DL
PSA SERPL-MCNC: 1.6 NG/ML (ref 0–4)
RBC # BLD AUTO: 5.03 MILLION/UL (ref 3.88–5.62)
SODIUM SERPL-SCNC: 138 MMOL/L (ref 136–145)
SP GR UR STRIP.AUTO: 1.01 (ref 1–1.03)
T4 FREE SERPL-MCNC: 1.25 NG/DL (ref 0.76–1.46)
TRIGL SERPL-MCNC: 76 MG/DL
TSH SERPL DL<=0.05 MIU/L-ACNC: 0.05 UIU/ML (ref 0.36–3.74)
URATE SERPL-MCNC: 5.1 MG/DL (ref 4.2–8)
UROBILINOGEN UR QL STRIP.AUTO: 0.2 E.U./DL
WBC # BLD AUTO: 5.53 THOUSAND/UL (ref 4.31–10.16)

## 2021-11-13 PROCEDURE — 80061 LIPID PANEL: CPT

## 2021-11-13 PROCEDURE — 84550 ASSAY OF BLOOD/URIC ACID: CPT

## 2021-11-13 PROCEDURE — 83036 HEMOGLOBIN GLYCOSYLATED A1C: CPT

## 2021-11-13 PROCEDURE — 36415 COLL VENOUS BLD VENIPUNCTURE: CPT

## 2021-11-13 PROCEDURE — 83735 ASSAY OF MAGNESIUM: CPT

## 2021-11-13 PROCEDURE — 84432 ASSAY OF THYROGLOBULIN: CPT

## 2021-11-13 PROCEDURE — 86800 THYROGLOBULIN ANTIBODY: CPT

## 2021-11-13 PROCEDURE — G0103 PSA SCREENING: HCPCS

## 2021-11-13 PROCEDURE — 84100 ASSAY OF PHOSPHORUS: CPT

## 2021-11-13 PROCEDURE — 81003 URINALYSIS AUTO W/O SCOPE: CPT

## 2021-11-13 PROCEDURE — 80053 COMPREHEN METABOLIC PANEL: CPT

## 2021-11-13 PROCEDURE — 84439 ASSAY OF FREE THYROXINE: CPT

## 2021-11-13 PROCEDURE — 84443 ASSAY THYROID STIM HORMONE: CPT

## 2021-11-13 PROCEDURE — 85025 COMPLETE CBC W/AUTO DIFF WBC: CPT

## 2021-11-16 LAB
THYROGLOB AB SERPL-ACNC: <1 IU/ML (ref 0–0.9)
THYROGLOB SERPL-MCNC: <0.1 NG/ML (ref 1.4–29.2)

## 2021-12-17 ENCOUNTER — OFFICE VISIT (OUTPATIENT)
Dept: UROLOGY | Facility: AMBULATORY SURGERY CENTER | Age: 61
End: 2021-12-17
Payer: COMMERCIAL

## 2021-12-17 VITALS
SYSTOLIC BLOOD PRESSURE: 126 MMHG | HEIGHT: 75 IN | WEIGHT: 207 LBS | DIASTOLIC BLOOD PRESSURE: 70 MMHG | BODY MASS INDEX: 25.74 KG/M2

## 2021-12-17 DIAGNOSIS — Z12.5 SCREENING FOR PROSTATE CANCER: ICD-10-CM

## 2021-12-17 DIAGNOSIS — C61 PROSTATE CANCER (HCC): Primary | ICD-10-CM

## 2021-12-17 LAB
SL AMB  POCT GLUCOSE, UA: NORMAL
SL AMB LEUKOCYTE ESTERASE,UA: NORMAL
SL AMB POCT BILIRUBIN,UA: NORMAL
SL AMB POCT BLOOD,UA: NORMAL
SL AMB POCT CLARITY,UA: CLEAR
SL AMB POCT COLOR,UA: YELLOW
SL AMB POCT KETONES,UA: NORMAL
SL AMB POCT NITRITE,UA: NORMAL
SL AMB POCT PH,UA: 6
SL AMB POCT SPECIFIC GRAVITY,UA: 1.02
SL AMB POCT URINE PROTEIN: NORMAL
SL AMB POCT UROBILINOGEN: 0.2

## 2021-12-17 PROCEDURE — 81002 URINALYSIS NONAUTO W/O SCOPE: CPT | Performed by: NURSE PRACTITIONER

## 2021-12-17 PROCEDURE — 99213 OFFICE O/P EST LOW 20 MIN: CPT | Performed by: NURSE PRACTITIONER

## 2021-12-17 RX ORDER — CETIRIZINE HYDROCHLORIDE 10 MG/1
10 TABLET ORAL DAILY
COMMUNITY

## 2023-02-18 ENCOUNTER — APPOINTMENT (OUTPATIENT)
Dept: LAB | Age: 63
End: 2023-02-18

## 2023-02-18 DIAGNOSIS — E87.0 TYPE I DIABETES MELLITUS WITH HYPEROSMOLAR COMA (HCC): ICD-10-CM

## 2023-02-18 DIAGNOSIS — C73 MALIGNANT NEOPLASM OF THYROID GLAND (HCC): ICD-10-CM

## 2023-02-18 DIAGNOSIS — E03.9 MYXEDEMA HEART DISEASE: ICD-10-CM

## 2023-02-18 DIAGNOSIS — E10.65 TYPE I DIABETES MELLITUS WITH HYPEROSMOLAR COMA (HCC): ICD-10-CM

## 2023-02-18 DIAGNOSIS — I51.9 MYXEDEMA HEART DISEASE: ICD-10-CM

## 2023-02-18 DIAGNOSIS — E89.0 POSTSURGICAL HYPOTHYROIDISM: ICD-10-CM

## 2023-02-18 DIAGNOSIS — E10.69 TYPE I DIABETES MELLITUS WITH HYPEROSMOLAR COMA (HCC): ICD-10-CM

## 2023-02-18 LAB
ALBUMIN SERPL BCP-MCNC: 3.7 G/DL (ref 3.5–5)
ALP SERPL-CCNC: 82 U/L (ref 46–116)
ALT SERPL W P-5'-P-CCNC: 32 U/L (ref 12–78)
ANION GAP SERPL CALCULATED.3IONS-SCNC: 2 MMOL/L (ref 4–13)
AST SERPL W P-5'-P-CCNC: 22 U/L (ref 5–45)
BACTERIA UR QL AUTO: ABNORMAL /HPF
BASOPHILS # BLD AUTO: 0.06 THOUSANDS/ÂΜL (ref 0–0.1)
BASOPHILS NFR BLD AUTO: 1 % (ref 0–1)
BILIRUB SERPL-MCNC: 0.77 MG/DL (ref 0.2–1)
BILIRUB UR QL STRIP: NEGATIVE
BUN SERPL-MCNC: 22 MG/DL (ref 5–25)
CALCIUM SERPL-MCNC: 9.2 MG/DL (ref 8.3–10.1)
CHLORIDE SERPL-SCNC: 106 MMOL/L (ref 96–108)
CLARITY UR: CLEAR
CO2 SERPL-SCNC: 29 MMOL/L (ref 21–32)
COLOR UR: ABNORMAL
CREAT SERPL-MCNC: 0.92 MG/DL (ref 0.6–1.3)
EOSINOPHIL # BLD AUTO: 0.13 THOUSAND/ÂΜL (ref 0–0.61)
EOSINOPHIL NFR BLD AUTO: 2 % (ref 0–6)
ERYTHROCYTE [DISTWIDTH] IN BLOOD BY AUTOMATED COUNT: 12.6 % (ref 11.6–15.1)
GFR SERPL CREATININE-BSD FRML MDRD: 89 ML/MIN/1.73SQ M
GLUCOSE P FAST SERPL-MCNC: 143 MG/DL (ref 65–99)
GLUCOSE UR STRIP-MCNC: NEGATIVE MG/DL
HCT VFR BLD AUTO: 45.6 % (ref 36.5–49.3)
HGB BLD-MCNC: 15.3 G/DL (ref 12–17)
HGB UR QL STRIP.AUTO: NEGATIVE
IMM GRANULOCYTES # BLD AUTO: 0.03 THOUSAND/UL (ref 0–0.2)
IMM GRANULOCYTES NFR BLD AUTO: 1 % (ref 0–2)
KETONES UR STRIP-MCNC: NEGATIVE MG/DL
LEUKOCYTE ESTERASE UR QL STRIP: NEGATIVE
LYMPHOCYTES # BLD AUTO: 1.65 THOUSANDS/ÂΜL (ref 0.6–4.47)
LYMPHOCYTES NFR BLD AUTO: 30 % (ref 14–44)
MAGNESIUM SERPL-MCNC: 2.2 MG/DL (ref 1.6–2.6)
MCH RBC QN AUTO: 30 PG (ref 26.8–34.3)
MCHC RBC AUTO-ENTMCNC: 33.6 G/DL (ref 31.4–37.4)
MCV RBC AUTO: 89 FL (ref 82–98)
MONOCYTES # BLD AUTO: 0.61 THOUSAND/ÂΜL (ref 0.17–1.22)
MONOCYTES NFR BLD AUTO: 11 % (ref 4–12)
MUCOUS THREADS UR QL AUTO: ABNORMAL
NEUTROPHILS # BLD AUTO: 2.96 THOUSANDS/ÂΜL (ref 1.85–7.62)
NEUTS SEG NFR BLD AUTO: 55 % (ref 43–75)
NITRITE UR QL STRIP: NEGATIVE
NON-SQ EPI CELLS URNS QL MICRO: ABNORMAL /HPF
NRBC BLD AUTO-RTO: 0 /100 WBCS
PH UR STRIP.AUTO: 6 [PH]
PHOSPHATE SERPL-MCNC: 3.4 MG/DL (ref 2.3–4.1)
PLATELET # BLD AUTO: 299 THOUSANDS/UL (ref 149–390)
PMV BLD AUTO: 10.5 FL (ref 8.9–12.7)
POTASSIUM SERPL-SCNC: 4.5 MMOL/L (ref 3.5–5.3)
PROT SERPL-MCNC: 6.8 G/DL (ref 6.4–8.4)
PROT UR STRIP-MCNC: NEGATIVE MG/DL
RBC # BLD AUTO: 5.1 MILLION/UL (ref 3.88–5.62)
RBC #/AREA URNS AUTO: ABNORMAL /HPF
SODIUM SERPL-SCNC: 137 MMOL/L (ref 135–147)
SP GR UR STRIP.AUTO: 1.01 (ref 1–1.03)
T4 FREE SERPL-MCNC: 1.41 NG/DL (ref 0.76–1.46)
TSH SERPL DL<=0.05 MIU/L-ACNC: 0.01 UIU/ML (ref 0.45–4.5)
UROBILINOGEN UR STRIP-ACNC: <2 MG/DL
WBC # BLD AUTO: 5.44 THOUSAND/UL (ref 4.31–10.16)
WBC #/AREA URNS AUTO: ABNORMAL /HPF

## 2023-02-21 LAB
EST. AVERAGE GLUCOSE BLD GHB EST-MCNC: 131 MG/DL
HBA1C MFR BLD: 6.2 %
THYROGLOB AB SERPL-ACNC: <1 IU/ML (ref 0–0.9)
THYROGLOB SERPL-MCNC: <0.1 NG/ML (ref 1.4–29.2)

## 2023-12-02 ENCOUNTER — APPOINTMENT (OUTPATIENT)
Dept: LAB | Age: 63
End: 2023-12-02
Payer: COMMERCIAL

## 2023-12-02 DIAGNOSIS — E13.42 OTHER SPECIFIED DIABETES MELLITUS WITH DIABETIC POLYNEUROPATHY, UNSPECIFIED WHETHER LONG TERM INSULIN USE (HCC): ICD-10-CM

## 2023-12-02 DIAGNOSIS — E03.9 HYPOTHYROIDISM, UNSPECIFIED TYPE: ICD-10-CM

## 2023-12-02 LAB
ALBUMIN SERPL BCP-MCNC: 3.9 G/DL (ref 3.5–5)
ALP SERPL-CCNC: 83 U/L (ref 34–104)
ALT SERPL W P-5'-P-CCNC: 21 U/L (ref 7–52)
ANION GAP SERPL CALCULATED.3IONS-SCNC: 8 MMOL/L
AST SERPL W P-5'-P-CCNC: 23 U/L (ref 13–39)
BASOPHILS # BLD AUTO: 0.1 THOUSANDS/ÂΜL (ref 0–0.1)
BASOPHILS NFR BLD AUTO: 1 % (ref 0–1)
BILIRUB SERPL-MCNC: 0.56 MG/DL (ref 0.2–1)
BUN SERPL-MCNC: 26 MG/DL (ref 5–25)
CALCIUM SERPL-MCNC: 9 MG/DL (ref 8.4–10.2)
CHLORIDE SERPL-SCNC: 105 MMOL/L (ref 96–108)
CO2 SERPL-SCNC: 28 MMOL/L (ref 21–32)
CREAT SERPL-MCNC: 0.92 MG/DL (ref 0.6–1.3)
CREAT UR-MCNC: 159.6 MG/DL
EOSINOPHIL # BLD AUTO: 0.22 THOUSAND/ÂΜL (ref 0–0.61)
EOSINOPHIL NFR BLD AUTO: 3 % (ref 0–6)
ERYTHROCYTE [DISTWIDTH] IN BLOOD BY AUTOMATED COUNT: 12.4 % (ref 11.6–15.1)
EST. AVERAGE GLUCOSE BLD GHB EST-MCNC: 166 MG/DL
GFR SERPL CREATININE-BSD FRML MDRD: 88 ML/MIN/1.73SQ M
GLUCOSE P FAST SERPL-MCNC: 115 MG/DL (ref 65–99)
HBA1C MFR BLD: 7.4 %
HCT VFR BLD AUTO: 45.4 % (ref 36.5–49.3)
HGB BLD-MCNC: 15.6 G/DL (ref 12–17)
IMM GRANULOCYTES # BLD AUTO: 0.06 THOUSAND/UL (ref 0–0.2)
IMM GRANULOCYTES NFR BLD AUTO: 1 % (ref 0–2)
LYMPHOCYTES # BLD AUTO: 2.07 THOUSANDS/ÂΜL (ref 0.6–4.47)
LYMPHOCYTES NFR BLD AUTO: 29 % (ref 14–44)
MAGNESIUM SERPL-MCNC: 2 MG/DL (ref 1.9–2.7)
MCH RBC QN AUTO: 30.7 PG (ref 26.8–34.3)
MCHC RBC AUTO-ENTMCNC: 34.4 G/DL (ref 31.4–37.4)
MCV RBC AUTO: 89 FL (ref 82–98)
MICROALBUMIN UR-MCNC: <7 MG/L
MICROALBUMIN/CREAT 24H UR: <4 MG/G CREATININE (ref 0–30)
MONOCYTES # BLD AUTO: 0.77 THOUSAND/ÂΜL (ref 0.17–1.22)
MONOCYTES NFR BLD AUTO: 11 % (ref 4–12)
NEUTROPHILS # BLD AUTO: 3.85 THOUSANDS/ÂΜL (ref 1.85–7.62)
NEUTS SEG NFR BLD AUTO: 55 % (ref 43–75)
NRBC BLD AUTO-RTO: 0 /100 WBCS
PHOSPHATE SERPL-MCNC: 3.8 MG/DL (ref 2.3–4.1)
PLATELET # BLD AUTO: 355 THOUSANDS/UL (ref 149–390)
PMV BLD AUTO: 10.8 FL (ref 8.9–12.7)
POTASSIUM SERPL-SCNC: 4 MMOL/L (ref 3.5–5.3)
PROT SERPL-MCNC: 6.7 G/DL (ref 6.4–8.4)
RBC # BLD AUTO: 5.08 MILLION/UL (ref 3.88–5.62)
SODIUM SERPL-SCNC: 141 MMOL/L (ref 135–147)
T4 FREE SERPL-MCNC: 1.22 NG/DL (ref 0.61–1.12)
TSH SERPL DL<=0.05 MIU/L-ACNC: 0.03 UIU/ML (ref 0.45–4.5)
WBC # BLD AUTO: 7.07 THOUSAND/UL (ref 4.31–10.16)

## 2023-12-02 PROCEDURE — 83735 ASSAY OF MAGNESIUM: CPT

## 2023-12-02 PROCEDURE — 84443 ASSAY THYROID STIM HORMONE: CPT

## 2023-12-02 PROCEDURE — 80053 COMPREHEN METABOLIC PANEL: CPT

## 2023-12-02 PROCEDURE — 36415 COLL VENOUS BLD VENIPUNCTURE: CPT

## 2023-12-02 PROCEDURE — 82570 ASSAY OF URINE CREATININE: CPT

## 2023-12-02 PROCEDURE — 84439 ASSAY OF FREE THYROXINE: CPT

## 2023-12-02 PROCEDURE — 85025 COMPLETE CBC W/AUTO DIFF WBC: CPT

## 2023-12-02 PROCEDURE — 84100 ASSAY OF PHOSPHORUS: CPT

## 2023-12-02 PROCEDURE — 86800 THYROGLOBULIN ANTIBODY: CPT

## 2023-12-02 PROCEDURE — 82043 UR ALBUMIN QUANTITATIVE: CPT

## 2023-12-02 PROCEDURE — 84432 ASSAY OF THYROGLOBULIN: CPT

## 2023-12-02 PROCEDURE — 83036 HEMOGLOBIN GLYCOSYLATED A1C: CPT

## 2023-12-05 LAB
THYROGLOB AB SERPL-ACNC: <1 IU/ML (ref 0–0.9)
THYROGLOB SERPL-MCNC: <0.1 NG/ML (ref 1.4–29.2)

## 2024-05-03 ENCOUNTER — APPOINTMENT (OUTPATIENT)
Dept: LAB | Age: 64
End: 2024-05-03
Payer: COMMERCIAL

## 2024-05-03 DIAGNOSIS — E10.69 TYPE I DIABETES MELLITUS WITH HYPEROSMOLAR COMA  (HCC): ICD-10-CM

## 2024-05-03 DIAGNOSIS — E87.0 TYPE I DIABETES MELLITUS WITH HYPEROSMOLAR COMA  (HCC): ICD-10-CM

## 2024-05-03 DIAGNOSIS — E03.9 ACQUIRED HYPOTHYROIDISM: ICD-10-CM

## 2024-05-03 DIAGNOSIS — E10.65 TYPE I DIABETES MELLITUS WITH HYPEROSMOLAR COMA  (HCC): ICD-10-CM

## 2024-05-03 LAB
25(OH)D3 SERPL-MCNC: 35.2 NG/ML (ref 30–100)
ALBUMIN SERPL BCP-MCNC: 4.1 G/DL (ref 3.5–5)
ALP SERPL-CCNC: 87 U/L (ref 34–104)
ALT SERPL W P-5'-P-CCNC: 19 U/L (ref 7–52)
AMYLASE SERPL-CCNC: 47 IU/L (ref 29–103)
ANION GAP SERPL CALCULATED.3IONS-SCNC: 8 MMOL/L (ref 4–13)
AST SERPL W P-5'-P-CCNC: 24 U/L (ref 13–39)
BACTERIA UR QL AUTO: NORMAL /HPF
BASOPHILS # BLD AUTO: 0.09 THOUSANDS/ÂΜL (ref 0–0.1)
BASOPHILS NFR BLD AUTO: 2 % (ref 0–1)
BILIRUB SERPL-MCNC: 0.94 MG/DL (ref 0.2–1)
BILIRUB UR QL STRIP: NEGATIVE
BUN SERPL-MCNC: 24 MG/DL (ref 5–25)
CALCIUM SERPL-MCNC: 9.5 MG/DL (ref 8.4–10.2)
CHLORIDE SERPL-SCNC: 103 MMOL/L (ref 96–108)
CHOLEST SERPL-MCNC: 164 MG/DL
CLARITY UR: CLEAR
CO2 SERPL-SCNC: 29 MMOL/L (ref 21–32)
COLOR UR: NORMAL
CREAT SERPL-MCNC: 0.96 MG/DL (ref 0.6–1.3)
CREAT UR-MCNC: 111.2 MG/DL
EOSINOPHIL # BLD AUTO: 0.15 THOUSAND/ÂΜL (ref 0–0.61)
EOSINOPHIL NFR BLD AUTO: 3 % (ref 0–6)
ERYTHROCYTE [DISTWIDTH] IN BLOOD BY AUTOMATED COUNT: 12.4 % (ref 11.6–15.1)
EST. AVERAGE GLUCOSE BLD GHB EST-MCNC: 151 MG/DL
GFR SERPL CREATININE-BSD FRML MDRD: 84 ML/MIN/1.73SQ M
GLUCOSE P FAST SERPL-MCNC: 126 MG/DL (ref 65–99)
GLUCOSE UR STRIP-MCNC: NEGATIVE MG/DL
HBA1C MFR BLD: 6.9 %
HCT VFR BLD AUTO: 47 % (ref 36.5–49.3)
HDLC SERPL-MCNC: 44 MG/DL
HGB BLD-MCNC: 15.8 G/DL (ref 12–17)
HGB UR QL STRIP.AUTO: NEGATIVE
IMM GRANULOCYTES # BLD AUTO: 0.05 THOUSAND/UL (ref 0–0.2)
IMM GRANULOCYTES NFR BLD AUTO: 1 % (ref 0–2)
KETONES UR STRIP-MCNC: NEGATIVE MG/DL
LDLC SERPL CALC-MCNC: 103 MG/DL (ref 0–100)
LEUKOCYTE ESTERASE UR QL STRIP: NEGATIVE
LIPASE SERPL-CCNC: 17 U/L (ref 11–82)
LYMPHOCYTES # BLD AUTO: 1.54 THOUSANDS/ÂΜL (ref 0.6–4.47)
LYMPHOCYTES NFR BLD AUTO: 27 % (ref 14–44)
MAGNESIUM SERPL-MCNC: 2.2 MG/DL (ref 1.9–2.7)
MCH RBC QN AUTO: 30.2 PG (ref 26.8–34.3)
MCHC RBC AUTO-ENTMCNC: 33.6 G/DL (ref 31.4–37.4)
MCV RBC AUTO: 90 FL (ref 82–98)
MICROALBUMIN UR-MCNC: <7 MG/L
MICROALBUMIN/CREAT 24H UR: <6 MG/G CREATININE (ref 0–30)
MONOCYTES # BLD AUTO: 0.67 THOUSAND/ÂΜL (ref 0.17–1.22)
MONOCYTES NFR BLD AUTO: 12 % (ref 4–12)
NEUTROPHILS # BLD AUTO: 3.31 THOUSANDS/ÂΜL (ref 1.85–7.62)
NEUTS SEG NFR BLD AUTO: 55 % (ref 43–75)
NITRITE UR QL STRIP: NEGATIVE
NON-SQ EPI CELLS URNS QL MICRO: NORMAL /HPF
NONHDLC SERPL-MCNC: 120 MG/DL
NRBC BLD AUTO-RTO: 0 /100 WBCS
PH UR STRIP.AUTO: 5.5 [PH]
PHOSPHATE SERPL-MCNC: 3.7 MG/DL (ref 2.3–4.1)
PLATELET # BLD AUTO: 359 THOUSANDS/UL (ref 149–390)
PMV BLD AUTO: 10.6 FL (ref 8.9–12.7)
POTASSIUM SERPL-SCNC: 4.7 MMOL/L (ref 3.5–5.3)
PROT SERPL-MCNC: 7 G/DL (ref 6.4–8.4)
PROT UR STRIP-MCNC: NEGATIVE MG/DL
RBC # BLD AUTO: 5.23 MILLION/UL (ref 3.88–5.62)
RBC #/AREA URNS AUTO: NORMAL /HPF
SODIUM SERPL-SCNC: 140 MMOL/L (ref 135–147)
SP GR UR STRIP.AUTO: 1.02 (ref 1–1.03)
T4 FREE SERPL-MCNC: 1.29 NG/DL (ref 0.61–1.12)
TRIGL SERPL-MCNC: 83 MG/DL
TSH SERPL DL<=0.05 MIU/L-ACNC: 0.01 UIU/ML (ref 0.45–4.5)
UROBILINOGEN UR STRIP-ACNC: <2 MG/DL
WBC # BLD AUTO: 5.81 THOUSAND/UL (ref 4.31–10.16)
WBC #/AREA URNS AUTO: NORMAL /HPF

## 2024-05-03 PROCEDURE — 84439 ASSAY OF FREE THYROXINE: CPT

## 2024-05-03 PROCEDURE — 82306 VITAMIN D 25 HYDROXY: CPT

## 2024-05-03 PROCEDURE — 83735 ASSAY OF MAGNESIUM: CPT

## 2024-05-03 PROCEDURE — 84681 ASSAY OF C-PEPTIDE: CPT

## 2024-05-03 PROCEDURE — 81001 URINALYSIS AUTO W/SCOPE: CPT

## 2024-05-03 PROCEDURE — 36415 COLL VENOUS BLD VENIPUNCTURE: CPT

## 2024-05-03 PROCEDURE — 86800 THYROGLOBULIN ANTIBODY: CPT

## 2024-05-03 PROCEDURE — 82150 ASSAY OF AMYLASE: CPT

## 2024-05-03 PROCEDURE — 83690 ASSAY OF LIPASE: CPT

## 2024-05-03 PROCEDURE — 82043 UR ALBUMIN QUANTITATIVE: CPT

## 2024-05-03 PROCEDURE — 82570 ASSAY OF URINE CREATININE: CPT

## 2024-05-03 PROCEDURE — 84100 ASSAY OF PHOSPHORUS: CPT

## 2024-05-03 PROCEDURE — 83036 HEMOGLOBIN GLYCOSYLATED A1C: CPT

## 2024-05-03 PROCEDURE — 84432 ASSAY OF THYROGLOBULIN: CPT

## 2024-05-03 PROCEDURE — 80053 COMPREHEN METABOLIC PANEL: CPT

## 2024-05-03 PROCEDURE — 85025 COMPLETE CBC W/AUTO DIFF WBC: CPT

## 2024-05-03 PROCEDURE — 84443 ASSAY THYROID STIM HORMONE: CPT

## 2024-05-03 PROCEDURE — 80061 LIPID PANEL: CPT

## 2024-05-04 LAB
C PEPTIDE SERPL-MCNC: <0.1 NG/ML (ref 1.1–4.4)
THYROGLOB AB SERPL-ACNC: <1 IU/ML (ref 0–0.9)
THYROGLOB SERPL-MCNC: <0.1 NG/ML (ref 1.4–29.2)

## 2024-05-29 ENCOUNTER — HOSPITAL ENCOUNTER (OUTPATIENT)
Dept: RADIOLOGY | Facility: HOSPITAL | Age: 64
Discharge: HOME | End: 2024-05-29

## 2024-05-29 DIAGNOSIS — R51.9 HEADACHE: ICD-10-CM

## 2024-06-09 ENCOUNTER — HOSPITAL ENCOUNTER (OUTPATIENT)
Dept: RADIOLOGY | Facility: HOSPITAL | Age: 64
Discharge: HOME | End: 2024-06-09
Payer: COMMERCIAL

## 2024-06-09 PROCEDURE — 70553 MRI BRAIN STEM W/O & W/DYE: CPT

## 2024-06-09 PROCEDURE — A9585 GADOBUTROL INJECTION: HCPCS | Performed by: FAMILY MEDICINE

## 2024-06-09 RX ORDER — GADOBUTROL 604.72 MG/ML
9 INJECTION INTRAVENOUS
Status: COMPLETED | OUTPATIENT
Start: 2024-06-09 | End: 2024-06-09

## 2024-06-09 RX ADMIN — GADOBUTROL 9 ML: 604.72 INJECTION INTRAVENOUS at 09:55

## 2024-08-24 ENCOUNTER — APPOINTMENT (OUTPATIENT)
Dept: LAB | Age: 64
End: 2024-08-24
Payer: COMMERCIAL

## 2024-08-24 DIAGNOSIS — C73 MALIGNANT NEOPLASM OF THYROID GLAND (HCC): ICD-10-CM

## 2024-08-24 DIAGNOSIS — I51.9 MYXEDEMA HEART DISEASE: ICD-10-CM

## 2024-08-24 DIAGNOSIS — E10.69 TYPE 1 DIABETES MELLITUS WITH OTHER SPECIFIED COMPLICATION (HCC): ICD-10-CM

## 2024-08-24 DIAGNOSIS — E89.0 POSTSURGICAL HYPOTHYROIDISM: ICD-10-CM

## 2024-08-24 DIAGNOSIS — E03.9 MYXEDEMA HEART DISEASE: ICD-10-CM

## 2024-08-24 LAB
ALBUMIN SERPL BCG-MCNC: 3.9 G/DL (ref 3.5–5)
ALP SERPL-CCNC: 83 U/L (ref 34–104)
ALT SERPL W P-5'-P-CCNC: 19 U/L (ref 7–52)
ANION GAP SERPL CALCULATED.3IONS-SCNC: 8 MMOL/L (ref 4–13)
AST SERPL W P-5'-P-CCNC: 23 U/L (ref 13–39)
BACTERIA UR QL AUTO: ABNORMAL /HPF
BASOPHILS # BLD AUTO: 0.1 THOUSANDS/ÂΜL (ref 0–0.1)
BASOPHILS NFR BLD AUTO: 2 % (ref 0–1)
BILIRUB SERPL-MCNC: 0.6 MG/DL (ref 0.2–1)
BILIRUB UR QL STRIP: NEGATIVE
BUN SERPL-MCNC: 19 MG/DL (ref 5–25)
CALCIUM SERPL-MCNC: 9.1 MG/DL (ref 8.4–10.2)
CHLORIDE SERPL-SCNC: 105 MMOL/L (ref 96–108)
CLARITY UR: CLEAR
CO2 SERPL-SCNC: 29 MMOL/L (ref 21–32)
COLOR UR: ABNORMAL
CREAT SERPL-MCNC: 0.98 MG/DL (ref 0.6–1.3)
CREAT UR-MCNC: 76.7 MG/DL
EOSINOPHIL # BLD AUTO: 0.23 THOUSAND/ÂΜL (ref 0–0.61)
EOSINOPHIL NFR BLD AUTO: 3 % (ref 0–6)
ERYTHROCYTE [DISTWIDTH] IN BLOOD BY AUTOMATED COUNT: 12.7 % (ref 11.6–15.1)
EST. AVERAGE GLUCOSE BLD GHB EST-MCNC: 174 MG/DL
GFR SERPL CREATININE-BSD FRML MDRD: 81 ML/MIN/1.73SQ M
GLUCOSE P FAST SERPL-MCNC: 92 MG/DL (ref 65–99)
GLUCOSE UR STRIP-MCNC: NEGATIVE MG/DL
HBA1C MFR BLD: 7.7 %
HCT VFR BLD AUTO: 46.6 % (ref 36.5–49.3)
HGB BLD-MCNC: 15.5 G/DL (ref 12–17)
HGB UR QL STRIP.AUTO: ABNORMAL
IMM GRANULOCYTES # BLD AUTO: 0.07 THOUSAND/UL (ref 0–0.2)
IMM GRANULOCYTES NFR BLD AUTO: 1 % (ref 0–2)
KETONES UR STRIP-MCNC: NEGATIVE MG/DL
LEUKOCYTE ESTERASE UR QL STRIP: NEGATIVE
LYMPHOCYTES # BLD AUTO: 2.42 THOUSANDS/ÂΜL (ref 0.6–4.47)
LYMPHOCYTES NFR BLD AUTO: 36 % (ref 14–44)
MAGNESIUM SERPL-MCNC: 2.2 MG/DL (ref 1.9–2.7)
MCH RBC QN AUTO: 29.8 PG (ref 26.8–34.3)
MCHC RBC AUTO-ENTMCNC: 33.3 G/DL (ref 31.4–37.4)
MCV RBC AUTO: 90 FL (ref 82–98)
MICROALBUMIN UR-MCNC: <7 MG/L
MONOCYTES # BLD AUTO: 0.8 THOUSAND/ÂΜL (ref 0.17–1.22)
MONOCYTES NFR BLD AUTO: 12 % (ref 4–12)
NEUTROPHILS # BLD AUTO: 3.12 THOUSANDS/ÂΜL (ref 1.85–7.62)
NEUTS SEG NFR BLD AUTO: 46 % (ref 43–75)
NITRITE UR QL STRIP: NEGATIVE
NON-SQ EPI CELLS URNS QL MICRO: ABNORMAL /HPF
NRBC BLD AUTO-RTO: 0 /100 WBCS
PH UR STRIP.AUTO: 7 [PH]
PHOSPHATE SERPL-MCNC: 2.9 MG/DL (ref 2.3–4.1)
PLATELET # BLD AUTO: 270 THOUSANDS/UL (ref 149–390)
PMV BLD AUTO: 10.5 FL (ref 8.9–12.7)
POTASSIUM SERPL-SCNC: 4 MMOL/L (ref 3.5–5.3)
PROT SERPL-MCNC: 6.7 G/DL (ref 6.4–8.4)
PROT UR STRIP-MCNC: NEGATIVE MG/DL
RBC # BLD AUTO: 5.2 MILLION/UL (ref 3.88–5.62)
RBC #/AREA URNS AUTO: ABNORMAL /HPF
SODIUM SERPL-SCNC: 142 MMOL/L (ref 135–147)
SP GR UR STRIP.AUTO: 1.01 (ref 1–1.03)
T4 FREE SERPL-MCNC: 1.54 NG/DL (ref 0.61–1.12)
TSH SERPL DL<=0.05 MIU/L-ACNC: 0.02 UIU/ML (ref 0.45–4.5)
UROBILINOGEN UR STRIP-ACNC: <2 MG/DL
WBC # BLD AUTO: 6.74 THOUSAND/UL (ref 4.31–10.16)
WBC #/AREA URNS AUTO: ABNORMAL /HPF

## 2024-08-24 PROCEDURE — 81001 URINALYSIS AUTO W/SCOPE: CPT

## 2024-08-24 PROCEDURE — 83036 HEMOGLOBIN GLYCOSYLATED A1C: CPT

## 2024-08-24 PROCEDURE — 85025 COMPLETE CBC W/AUTO DIFF WBC: CPT

## 2024-08-24 PROCEDURE — 80053 COMPREHEN METABOLIC PANEL: CPT

## 2024-08-24 PROCEDURE — 84432 ASSAY OF THYROGLOBULIN: CPT

## 2024-08-24 PROCEDURE — 86800 THYROGLOBULIN ANTIBODY: CPT

## 2024-08-24 PROCEDURE — 36415 COLL VENOUS BLD VENIPUNCTURE: CPT

## 2024-08-24 PROCEDURE — 84100 ASSAY OF PHOSPHORUS: CPT

## 2024-08-24 PROCEDURE — 83735 ASSAY OF MAGNESIUM: CPT

## 2024-08-24 PROCEDURE — 84443 ASSAY THYROID STIM HORMONE: CPT

## 2024-08-24 PROCEDURE — 82570 ASSAY OF URINE CREATININE: CPT

## 2024-08-24 PROCEDURE — 82043 UR ALBUMIN QUANTITATIVE: CPT

## 2024-08-24 PROCEDURE — 84439 ASSAY OF FREE THYROXINE: CPT

## 2024-08-27 LAB
THYROGLOB AB SERPL-ACNC: <1 IU/ML (ref 0–0.9)
THYROGLOB SERPL-MCNC: <0.1 NG/ML (ref 1.4–29.2)

## 2025-02-04 ENCOUNTER — TELEPHONE (OUTPATIENT)
Age: 65
End: 2025-02-04

## 2025-02-04 ENCOUNTER — PREP FOR PROCEDURE (OUTPATIENT)
Age: 65
End: 2025-02-04

## 2025-02-04 DIAGNOSIS — Z86.0100 HISTORY OF COLON POLYPS: Primary | ICD-10-CM

## 2025-02-04 NOTE — TELEPHONE ENCOUNTER
25  Screened by: Mayuri Chavez    Referring Provider Dr Monroy    Pre- Screenin' 3 200 BMI 25.0    Has patient been referred for a routine screening Colonoscopy? yes  Is the patient between 45-75 years old? yes      Previous Colonoscopy yes   If yes:    Date:     Facility:     Reason:         Does the patient want to see a Gastroenterologist prior to their procedure OR are they having any GI symptoms? no    Has the patient been hospitalized or had abdominal surgery in the past 6 months? no    Does the patient use supplemental oxygen? no    Does the patient take Coumadin, Lovenox, Plavix, Elliquis, Xarelto, or other blood thinning medication? no    Has the patient had a stroke, cardiac event, or stent placed in the past year? no      If patient is between 45yrs - 49yrs, please advise patient that we will have to confirm benefits & coverage with their insurance company for a routine screening colonoscopy.

## 2025-02-04 NOTE — TELEPHONE ENCOUNTER
Scheduled date of colonoscopy (as of today):2/26/25    Physician performing colonoscopy:Dr Lambert    Location of colonoscopy:[place]    Bowel prep reviewed with patient:miralax/dulcolax    Instructions reviewed with patient by:prep instructions sent via email on chart    Clearances: N/A

## 2025-02-08 ENCOUNTER — APPOINTMENT (OUTPATIENT)
Dept: LAB | Age: 65
End: 2025-02-08
Payer: COMMERCIAL

## 2025-02-08 DIAGNOSIS — E10.65 TYPE I DIABETES MELLITUS WITH HYPEROSMOLAR COMA (HCC): ICD-10-CM

## 2025-02-08 DIAGNOSIS — E87.0 TYPE I DIABETES MELLITUS WITH HYPEROSMOLAR COMA (HCC): ICD-10-CM

## 2025-02-08 DIAGNOSIS — I51.9 MYXEDEMA HEART DISEASE: ICD-10-CM

## 2025-02-08 DIAGNOSIS — C73 MALIGNANT NEOPLASM OF THYROID GLAND (HCC): ICD-10-CM

## 2025-02-08 DIAGNOSIS — E10.69 TYPE I DIABETES MELLITUS WITH HYPEROSMOLAR COMA (HCC): ICD-10-CM

## 2025-02-08 DIAGNOSIS — E03.9 MYXEDEMA HEART DISEASE: ICD-10-CM

## 2025-02-08 DIAGNOSIS — E34.9 MULTIPLE ENDOCRINE DEFICIENCY SYNDROME: ICD-10-CM

## 2025-02-08 LAB
ALBUMIN SERPL BCG-MCNC: 4.2 G/DL (ref 3.5–5)
ALP SERPL-CCNC: 87 U/L (ref 34–104)
ALT SERPL W P-5'-P-CCNC: 18 U/L (ref 7–52)
ANION GAP SERPL CALCULATED.3IONS-SCNC: 11 MMOL/L (ref 4–13)
AST SERPL W P-5'-P-CCNC: 21 U/L (ref 13–39)
BACTERIA UR QL AUTO: NORMAL /HPF
BASOPHILS # BLD AUTO: 0.05 THOUSANDS/ΜL (ref 0–0.1)
BASOPHILS NFR BLD AUTO: 1 % (ref 0–1)
BILIRUB SERPL-MCNC: 0.9 MG/DL (ref 0.2–1)
BILIRUB UR QL STRIP: NEGATIVE
BUN SERPL-MCNC: 18 MG/DL (ref 5–25)
CALCIUM SERPL-MCNC: 9.6 MG/DL (ref 8.4–10.2)
CHLORIDE SERPL-SCNC: 103 MMOL/L (ref 96–108)
CHOLEST SERPL-MCNC: 150 MG/DL (ref ?–200)
CLARITY UR: CLEAR
CO2 SERPL-SCNC: 27 MMOL/L (ref 21–32)
COLOR UR: NORMAL
CREAT SERPL-MCNC: 1 MG/DL (ref 0.6–1.3)
CREAT UR-MCNC: 131.8 MG/DL
CRP SERPL HS-MCNC: 1.02 MG/L
EOSINOPHIL # BLD AUTO: 0.12 THOUSAND/ΜL (ref 0–0.61)
EOSINOPHIL NFR BLD AUTO: 2 % (ref 0–6)
ERYTHROCYTE [DISTWIDTH] IN BLOOD BY AUTOMATED COUNT: 12.7 % (ref 11.6–15.1)
EST. AVERAGE GLUCOSE BLD GHB EST-MCNC: 140 MG/DL
GFR SERPL CREATININE-BSD FRML MDRD: 79 ML/MIN/1.73SQ M
GLUCOSE P FAST SERPL-MCNC: 123 MG/DL (ref 65–99)
GLUCOSE UR STRIP-MCNC: NEGATIVE MG/DL
HBA1C MFR BLD: 6.5 %
HCT VFR BLD AUTO: 48.6 % (ref 36.5–49.3)
HDLC SERPL-MCNC: 45 MG/DL
HGB BLD-MCNC: 16 G/DL (ref 12–17)
HGB UR QL STRIP.AUTO: NEGATIVE
IMM GRANULOCYTES # BLD AUTO: 0.03 THOUSAND/UL (ref 0–0.2)
IMM GRANULOCYTES NFR BLD AUTO: 1 % (ref 0–2)
KETONES UR STRIP-MCNC: NEGATIVE MG/DL
LDLC SERPL CALC-MCNC: 90 MG/DL (ref 0–100)
LEUKOCYTE ESTERASE UR QL STRIP: NEGATIVE
LYMPHOCYTES # BLD AUTO: 1.6 THOUSANDS/ΜL (ref 0.6–4.47)
LYMPHOCYTES NFR BLD AUTO: 28 % (ref 14–44)
MAGNESIUM SERPL-MCNC: 2 MG/DL (ref 1.9–2.7)
MCH RBC QN AUTO: 29.8 PG (ref 26.8–34.3)
MCHC RBC AUTO-ENTMCNC: 32.9 G/DL (ref 31.4–37.4)
MCV RBC AUTO: 91 FL (ref 82–98)
MICROALBUMIN UR-MCNC: <7 MG/L
MONOCYTES # BLD AUTO: 0.59 THOUSAND/ΜL (ref 0.17–1.22)
MONOCYTES NFR BLD AUTO: 10 % (ref 4–12)
NEUTROPHILS # BLD AUTO: 3.41 THOUSANDS/ΜL (ref 1.85–7.62)
NEUTS SEG NFR BLD AUTO: 58 % (ref 43–75)
NITRITE UR QL STRIP: NEGATIVE
NON-SQ EPI CELLS URNS QL MICRO: NORMAL /HPF
NONHDLC SERPL-MCNC: 105 MG/DL
NRBC BLD AUTO-RTO: 0 /100 WBCS
PH UR STRIP.AUTO: 7 [PH]
PHOSPHATE SERPL-MCNC: 3.4 MG/DL (ref 2.3–4.1)
PLATELET # BLD AUTO: 339 THOUSANDS/UL (ref 149–390)
PMV BLD AUTO: 11.4 FL (ref 8.9–12.7)
POTASSIUM SERPL-SCNC: 4.4 MMOL/L (ref 3.5–5.3)
PROT SERPL-MCNC: 7.1 G/DL (ref 6.4–8.4)
PROT UR STRIP-MCNC: NEGATIVE MG/DL
RBC # BLD AUTO: 5.37 MILLION/UL (ref 3.88–5.62)
RBC #/AREA URNS AUTO: NORMAL /HPF
SODIUM SERPL-SCNC: 141 MMOL/L (ref 135–147)
SP GR UR STRIP.AUTO: 1.02 (ref 1–1.03)
T4 FREE SERPL-MCNC: 1.35 NG/DL (ref 0.61–1.12)
TRIGL SERPL-MCNC: 73 MG/DL (ref ?–150)
TSH SERPL DL<=0.05 MIU/L-ACNC: 0.02 UIU/ML (ref 0.45–4.5)
UROBILINOGEN UR STRIP-ACNC: <2 MG/DL
WBC # BLD AUTO: 5.8 THOUSAND/UL (ref 4.31–10.16)
WBC #/AREA URNS AUTO: NORMAL /HPF

## 2025-02-08 PROCEDURE — 85025 COMPLETE CBC W/AUTO DIFF WBC: CPT

## 2025-02-08 PROCEDURE — 81001 URINALYSIS AUTO W/SCOPE: CPT

## 2025-02-08 PROCEDURE — 86800 THYROGLOBULIN ANTIBODY: CPT

## 2025-02-08 PROCEDURE — 80053 COMPREHEN METABOLIC PANEL: CPT

## 2025-02-08 PROCEDURE — 83735 ASSAY OF MAGNESIUM: CPT

## 2025-02-08 PROCEDURE — 84439 ASSAY OF FREE THYROXINE: CPT

## 2025-02-08 PROCEDURE — 86141 C-REACTIVE PROTEIN HS: CPT

## 2025-02-08 PROCEDURE — 82570 ASSAY OF URINE CREATININE: CPT

## 2025-02-08 PROCEDURE — 84443 ASSAY THYROID STIM HORMONE: CPT

## 2025-02-08 PROCEDURE — 82043 UR ALBUMIN QUANTITATIVE: CPT

## 2025-02-08 PROCEDURE — 84100 ASSAY OF PHOSPHORUS: CPT

## 2025-02-08 PROCEDURE — 83036 HEMOGLOBIN GLYCOSYLATED A1C: CPT

## 2025-02-08 PROCEDURE — 36415 COLL VENOUS BLD VENIPUNCTURE: CPT

## 2025-02-08 PROCEDURE — 80061 LIPID PANEL: CPT

## 2025-02-08 PROCEDURE — 84432 ASSAY OF THYROGLOBULIN: CPT

## 2025-02-08 PROCEDURE — 83695 ASSAY OF LIPOPROTEIN(A): CPT

## 2025-02-10 LAB — LPA SERPL-SCNC: 53.3 NMOL/L

## 2025-02-11 LAB
THYROGLOB AB SERPL-ACNC: <1 IU/ML (ref 0–0.9)
THYROGLOB SERPL-MCNC: <0.1 NG/ML (ref 1.4–29.2)

## 2025-02-12 ENCOUNTER — ANESTHESIA (OUTPATIENT)
Dept: ANESTHESIOLOGY | Facility: HOSPITAL | Age: 65
End: 2025-02-12

## 2025-02-12 ENCOUNTER — ANESTHESIA EVENT (OUTPATIENT)
Dept: ANESTHESIOLOGY | Facility: HOSPITAL | Age: 65
End: 2025-02-12

## 2025-02-26 ENCOUNTER — ANESTHESIA EVENT (OUTPATIENT)
Dept: GASTROENTEROLOGY | Facility: MEDICAL CENTER | Age: 65
End: 2025-02-26
Payer: COMMERCIAL

## 2025-02-26 ENCOUNTER — HOSPITAL ENCOUNTER (OUTPATIENT)
Dept: GASTROENTEROLOGY | Facility: MEDICAL CENTER | Age: 65
Setting detail: OUTPATIENT SURGERY
Discharge: HOME | End: 2025-02-26
Attending: INTERNAL MEDICINE
Payer: COMMERCIAL

## 2025-02-26 ENCOUNTER — ANESTHESIA (OUTPATIENT)
Dept: GASTROENTEROLOGY | Facility: MEDICAL CENTER | Age: 65
End: 2025-02-26
Payer: COMMERCIAL

## 2025-02-26 VITALS
TEMPERATURE: 98.3 F | WEIGHT: 198 LBS | HEIGHT: 75 IN | SYSTOLIC BLOOD PRESSURE: 108 MMHG | HEART RATE: 84 BPM | BODY MASS INDEX: 24.62 KG/M2 | RESPIRATION RATE: 16 BRPM | OXYGEN SATURATION: 99 % | DIASTOLIC BLOOD PRESSURE: 62 MMHG

## 2025-02-26 DIAGNOSIS — R14.0 BLOATING: Primary | ICD-10-CM

## 2025-02-26 DIAGNOSIS — R10.84 GENERALIZED ABDOMINAL PAIN: ICD-10-CM

## 2025-02-26 DIAGNOSIS — Z86.0100 HISTORY OF COLON POLYPS: ICD-10-CM

## 2025-02-26 PROBLEM — E89.0 POSTOPERATIVE HYPOTHYROIDISM: Status: ACTIVE | Noted: 2025-02-26

## 2025-02-26 PROBLEM — E10.9 DIABETES MELLITUS TYPE 1 (HCC): Status: ACTIVE | Noted: 2025-02-26

## 2025-02-26 PROCEDURE — 88305 TISSUE EXAM BY PATHOLOGIST: CPT | Performed by: PATHOLOGY

## 2025-02-26 PROCEDURE — 88341 IMHCHEM/IMCYTCHM EA ADD ANTB: CPT | Performed by: PATHOLOGY

## 2025-02-26 PROCEDURE — 88342 IMHCHEM/IMCYTCHM 1ST ANTB: CPT | Performed by: PATHOLOGY

## 2025-02-26 PROCEDURE — 45380 COLONOSCOPY AND BIOPSY: CPT | Performed by: INTERNAL MEDICINE

## 2025-02-26 PROCEDURE — 45385 COLONOSCOPY W/LESION REMOVAL: CPT | Performed by: INTERNAL MEDICINE

## 2025-02-26 PROCEDURE — 43239 EGD BIOPSY SINGLE/MULTIPLE: CPT | Performed by: INTERNAL MEDICINE

## 2025-02-26 RX ORDER — PROPOFOL 10 MG/ML
INJECTION, EMULSION INTRAVENOUS AS NEEDED
Status: DISCONTINUED | OUTPATIENT
Start: 2025-02-26 | End: 2025-02-26

## 2025-02-26 RX ORDER — ASPIRIN 81 MG/1
81 TABLET, CHEWABLE ORAL DAILY
COMMUNITY

## 2025-02-26 RX ORDER — LIDOCAINE HYDROCHLORIDE 20 MG/ML
INJECTION, SOLUTION EPIDURAL; INFILTRATION; INTRACAUDAL; PERINEURAL AS NEEDED
Status: DISCONTINUED | OUTPATIENT
Start: 2025-02-26 | End: 2025-02-26

## 2025-02-26 RX ORDER — SODIUM CHLORIDE, SODIUM LACTATE, POTASSIUM CHLORIDE, CALCIUM CHLORIDE 600; 310; 30; 20 MG/100ML; MG/100ML; MG/100ML; MG/100ML
125 INJECTION, SOLUTION INTRAVENOUS CONTINUOUS
Status: DISCONTINUED | OUTPATIENT
Start: 2025-02-26 | End: 2025-03-02 | Stop reason: HOSPADM

## 2025-02-26 RX ADMIN — PROPOFOL 50 MG: 10 INJECTION, EMULSION INTRAVENOUS at 08:28

## 2025-02-26 RX ADMIN — PROPOFOL 50 MG: 10 INJECTION, EMULSION INTRAVENOUS at 08:25

## 2025-02-26 RX ADMIN — PROPOFOL 50 MG: 10 INJECTION, EMULSION INTRAVENOUS at 08:36

## 2025-02-26 RX ADMIN — LIDOCAINE HYDROCHLORIDE 100 MG: 20 INJECTION, SOLUTION EPIDURAL; INFILTRATION; INTRACAUDAL at 08:20

## 2025-02-26 RX ADMIN — SODIUM CHLORIDE, SODIUM LACTATE, POTASSIUM CHLORIDE, AND CALCIUM CHLORIDE: .6; .31; .03; .02 INJECTION, SOLUTION INTRAVENOUS at 08:06

## 2025-02-26 RX ADMIN — PROPOFOL 150 MG: 10 INJECTION, EMULSION INTRAVENOUS at 08:20

## 2025-02-26 RX ADMIN — PROPOFOL 50 MG: 10 INJECTION, EMULSION INTRAVENOUS at 08:32

## 2025-02-26 RX ADMIN — Medication 40 MG: at 08:33

## 2025-02-26 NOTE — ANESTHESIA POSTPROCEDURE EVALUATION
Post-Op Assessment Note    CV Status:  Stable    Pain management: adequate       Mental Status:  Alert and awake   Hydration Status:  Euvolemic   PONV Controlled:  Controlled   Airway Patency:  Patent     Post Op Vitals Reviewed: Yes    No anethesia notable event occurred.    Staff: CRNA           Last Filed PACU Vitals:  Vitals Value Taken Time   Temp 98.2    Pulse 78    /59    Resp 18    SpO2 99

## 2025-02-26 NOTE — H&P
"History and Physical -  [facility]  Jean-Paul Snyder 64 y.o. male MRN: [id number]                  HPI: Jean-Paul Snyder is a 64 y.o. year old male who presents for history of colon polyps.      REVIEW OF SYSTEMS: Per the HPI, and otherwise unremarkable.    Historical Information   Past Medical History:   Diagnosis Date    Cancer (HCC)     thyroid    Diabetes mellitus (HCC)     Disease of thyroid gland      Past Surgical History:   Procedure Laterality Date    COLONOSCOPY      [place] COLONOSCOPY FLX DX W/COLLJ SPEC WHEN PFRMD N/A 10/25/2017    Procedure: COLONOSCOPY;  Surgeon: Merrill Lambert MD;  Location: [facility];  Service: Gastroenterology    THYROIDECTOMY       Social History   Social History     Substance and Sexual Activity   Alcohol Use Yes    Comment: rarely     Social History     Substance and Sexual Activity   Drug Use No     Social History     Tobacco Use   Smoking Status Never   Smokeless Tobacco Never     History reviewed. No pertinent family history.    Meds/Allergies       Current Outpatient Medications:     aspirin 81 mg chewable tablet    levothyroxine 175 mcg tablet    cetirizine (ZyrTEC) 10 mg tablet    insulin aspart (NovoLOG) 100 units/mL injection    insulin lispro (HumaLOG) 100 units/mL injection    montelukast (SINGULAIR) 10 mg tablet    Allergies   Allergen Reactions    Penicillins Hives       Objective     /69   Pulse 89   Temp 97.7 °F (36.5 °C) (Temporal)   Resp 16   Ht 6' 3\" (1.905 m)   Wt 89.8 kg (198 lb)   SpO2 99%   BMI 24.75 kg/m²       PHYSICAL EXAM    Gen: NAD  Head: NCAT  CV: RRR  CHEST: Clear  ABD: soft, NT/ND  EXT: no edema      ASSESSMENT/PLAN:  This is a 64 y.o. year old male here for colonoscopy, and he is stable and optimized for his procedure.        "

## 2025-02-26 NOTE — ANESTHESIA PREPROCEDURE EVALUATION
Procedure:  COLONOSCOPY    Relevant Problems   ANESTHESIA (within normal limits)      CARDIO (within normal limits)      ENDO   (+) Diabetes mellitus type 1 (HCC)   (+) Postoperative hypothyroidism      GI/HEPATIC (within normal limits)      /RENAL (within normal limits)      HEMATOLOGY (within normal limits)      MUSCULOSKELETAL (within normal limits)      NEURO/PSYCH (within normal limits)      PULMONARY (within normal limits)        Physical Exam    Airway    Mallampati score: II  TM Distance: >3 FB  Neck ROM: full     Dental   No notable dental hx     Cardiovascular  Rhythm: regular, Rate: normal, Cardiovascular exam normal    Pulmonary  Pulmonary exam normal Breath sounds clear to auscultation    Other Findings  post-pubertal.      Anesthesia Plan  ASA Score- 2     Anesthesia Type- IV sedation with anesthesia with ASA Monitors.         Additional Monitors:     Airway Plan:     Comment: Patient with insulin pump and dexcom - programmed to low basal rate during procedure.  in pre-op..       Plan Factors-Exercise tolerance (METS): >4 METS.    Chart reviewed. EKG reviewed. Imaging results reviewed. Existing labs reviewed. Patient summary reviewed.    Patient is not a current smoker.  Patient instructed to abstain from smoking on day of procedure. Patient did not smoke on day of surgery.    Obstructive sleep apnea risk education given perioperatively.        Induction- intravenous.    Postoperative Plan-     Perioperative Resuscitation Plan - Level 1 - Full Code.       Informed Consent- Anesthetic plan and risks discussed with patient.  I personally reviewed this patient with the CRNA. Discussed and agreed on the Anesthesia Plan with the CRNA..      NPO Status:  Vitals Value Taken Time   Date of last liquid 02/26/25 02/26/25 0734   Time of last liquid 0305 02/26/25 0734   Date of last solid 02/24/25 02/26/25 0734   Time of last solid 2000 02/26/25 0734

## 2025-02-28 ENCOUNTER — TELEPHONE (OUTPATIENT)
Age: 65
End: 2025-02-28

## 2025-02-28 NOTE — TELEPHONE ENCOUNTER
New Patient    Appointment Scheduling  What office location does the patient prefer?: [place]  What is the reason for the patient's appointment?: prostate cancer screening   Have patient records been requested?: in epic  If No, are the records showing in Epic:     Appointment Details  Date: 3/20/25  Time: 140pm     Location: [place]  Provider:  Mary Nunez  Does the appointment need further review? (Reason)      HISTORY  Is the patient having active symptoms? If so, describe symptoms: no  Has the patient had any previous Urologist(s)?: [facility] over 3 yrs ago  Was the patient seen in the ED?: no  Has the patient had any outside testing done?: no  Does patient have Imaging/Lab Results: no  Have you had Cancer? no    INSURANCE   Have you confirmed Patient's insurance? yes  Is the insurance accepted?  yes  Is the insurance active? yes

## 2025-03-04 PROCEDURE — 88342 IMHCHEM/IMCYTCHM 1ST ANTB: CPT | Performed by: PATHOLOGY

## 2025-03-04 PROCEDURE — 88305 TISSUE EXAM BY PATHOLOGIST: CPT | Performed by: PATHOLOGY

## 2025-03-04 PROCEDURE — 88341 IMHCHEM/IMCYTCHM EA ADD ANTB: CPT | Performed by: PATHOLOGY

## 2025-03-06 ENCOUNTER — RESULTS FOLLOW-UP (OUTPATIENT)
Dept: GASTROENTEROLOGY | Facility: CLINIC | Age: 65
End: 2025-03-06

## 2025-03-06 NOTE — RESULT ENCOUNTER NOTE
The results were negative (unremarkable).  Repeat colonoscopy in 5 years because of previous history of colon polyps and family history of colon cancer.  This was communicated via [employer].

## 2025-03-14 RX ORDER — BLOOD-GLUCOSE SENSOR
1 EACH MISCELLANEOUS DAILY
COMMUNITY
Start: 2025-01-20

## 2025-03-14 RX ORDER — BALOXAVIR MARBOXIL 80 MG/1
1 TABLET, FILM COATED ORAL DAILY
COMMUNITY
End: 2025-03-20 | Stop reason: ALTCHOICE

## 2025-03-20 ENCOUNTER — APPOINTMENT (OUTPATIENT)
Dept: LAB | Age: 65
End: 2025-03-20
Payer: COMMERCIAL

## 2025-03-20 ENCOUNTER — OFFICE VISIT (OUTPATIENT)
Dept: UROLOGY | Facility: AMBULATORY SURGERY CENTER | Age: 65
End: 2025-03-20
Payer: COMMERCIAL

## 2025-03-20 VITALS
DIASTOLIC BLOOD PRESSURE: 70 MMHG | SYSTOLIC BLOOD PRESSURE: 130 MMHG | BODY MASS INDEX: 24.75 KG/M2 | HEART RATE: 85 BPM | HEIGHT: 75 IN | OXYGEN SATURATION: 98 %

## 2025-03-20 DIAGNOSIS — Z12.5 SCREENING FOR PROSTATE CANCER: ICD-10-CM

## 2025-03-20 DIAGNOSIS — E10.69 TYPE 1 DIABETES MELLITUS WITH OTHER SPECIFIED COMPLICATION (HCC): Primary | ICD-10-CM

## 2025-03-20 LAB
PSA SERPL-MCNC: 2.23 NG/ML (ref 0–4)
SL AMB  POCT GLUCOSE, UA: NORMAL
SL AMB LEUKOCYTE ESTERASE,UA: NORMAL
SL AMB POCT BILIRUBIN,UA: NORMAL
SL AMB POCT BLOOD,UA: NORMAL
SL AMB POCT CLARITY,UA: CLEAR
SL AMB POCT COLOR,UA: YELLOW
SL AMB POCT KETONES,UA: NORMAL
SL AMB POCT NITRITE,UA: NORMAL
SL AMB POCT PH,UA: 5
SL AMB POCT SPECIFIC GRAVITY,UA: 1.01
SL AMB POCT URINE PROTEIN: NORMAL
SL AMB POCT UROBILINOGEN: 0.2

## 2025-03-20 PROCEDURE — G0103 PSA SCREENING: HCPCS

## 2025-03-20 PROCEDURE — 81002 URINALYSIS NONAUTO W/O SCOPE: CPT

## 2025-03-20 PROCEDURE — 99203 OFFICE O/P NEW LOW 30 MIN: CPT

## 2025-03-20 PROCEDURE — 36415 COLL VENOUS BLD VENIPUNCTURE: CPT

## 2025-03-20 NOTE — PROGRESS NOTES
Name: Jean-Paul Snyder      : 1960      MRN: [id number]  Encounter Provider: KERI Leblanc  Encounter Date: 3/20/2025   Encounter department: [facility] [place]  :  Assessment & Plan  Screening for prostate cancer  Patient has not had prostate cancer screening in several years and is wishing to follow with a urologist for yearly screening.  He denies any family history of prostate cancer.  His last PSA was low in  and was reported at 1.6.  I recommend having a repeat PSA now and I will call him with these results.  If everything remains stable we can plan to follow-up in 1 year.    Lab Results   Component Value Date    PSA 1.6 2021    PSA 1.3 2020    PSA 1.2 2019      Orders:    POCT urine dip    PSA, Total Screen; Future    Type 1 diabetes mellitus with other specified complication (HCC)  Continue to follow with endocrinologist  Lab Results   Component Value Date    HGBA1C 6.5 (H) 2025            History of Present Illness   Jean-Paul Snyder is a 64 y.o. male who presents today to the office to reestablish care for prostate cancer screening.  He was last seen in 2021.    Today in the office he states that he is overall doing very well.  He denies any urinary/urological complaints.  He denies any strong family history of prostate cancer.  He states that his only other underlying medical issue is type 1 diabetes that he follows with an endocrinologist very closely 4.  No urinary symptoms.    AUA SYMPTOM SCORE      Flowsheet Row Most Recent Value   AUA SYMPTOM SCORE    How often have you had a sensation of not emptying your bladder completely after you finished urinating? 0 (P)     How often have you had to urinate again less than two hours after you finished urinating? 0 (P)     How often have you found you stopped and started again several times when you urinate? 1 (P)     How often have you found it difficult to postpone urination? 0 (P)    "  How often have you had a weak urinary stream? 2 (P)     How often have you had to push or strain to begin urination? 0 (P)     How many times did you most typically get up to urinate from the time you went to bed at night until the time you got up in the morning? 1 (P)     Quality of Life: If you were to spend the rest of your life with your urinary condition just the way it is now, how would you feel about that? 1 (P)     AUA SYMPTOM SCORE 4 (P)            Review of Systems   Constitutional:  Negative for chills and fever.   Respiratory: Negative.  Negative for cough and shortness of breath.    Cardiovascular: Negative.  Negative for chest pain.   Gastrointestinal: Negative.  Negative for abdominal distention, abdominal pain, nausea and vomiting.   Genitourinary:  Negative for decreased urine volume, difficulty urinating, dysuria, flank pain, frequency, hematuria, penile discharge, penile pain, penile swelling, scrotal swelling, testicular pain and urgency.   Skin: Negative.  Negative for rash.   Neurological: Negative.    Hematological:  Negative for adenopathy. Does not bruise/bleed easily.          Objective   /70 (BP Location: Left arm, Patient Position: Sitting, Cuff Size: Standard)   Pulse 85   Ht 6' 3\" (1.905 m)   SpO2 98%   BMI 24.75 kg/m²     Physical Exam  Vitals reviewed.   Constitutional:       Appearance: Normal appearance.   HENT:      Head: Normocephalic and atraumatic.   Eyes:      Pupils: Pupils are equal, round, and reactive to light.   Cardiovascular:      Rate and Rhythm: Normal rate.   Pulmonary:      Effort: Pulmonary effort is normal.   Abdominal:      General: Abdomen is flat.      Palpations: Abdomen is soft.   Musculoskeletal:      Cervical back: Normal range of motion.   Skin:     General: Skin is warm and dry.   Neurological:      General: No focal deficit present.      Mental Status: He is alert and oriented to person, place, and time.   Psychiatric:         Mood and Affect: " Mood normal.         Behavior: Behavior normal.         Thought Content: Thought content normal.         Judgment: Judgment normal.           Results   Lab Results   Component Value Date    PSA 1.6 11/13/2021    PSA 1.3 11/04/2020    PSA 1.2 11/25/2019     Lab Results   Component Value Date    GLUCOSE 124 10/14/2014    CALCIUM 9.6 02/08/2025     10/14/2014    K 4.4 02/08/2025    CO2 27 02/08/2025     02/08/2025    BUN 18 02/08/2025    CREATININE 1.00 02/08/2025     Lab Results   Component Value Date    WBC 5.80 02/08/2025    HGB 16.0 02/08/2025    HCT 48.6 02/08/2025    MCV 91 02/08/2025     02/08/2025       Office Urine Dip  Recent Results (from the past hour)   POCT urine dip    Collection Time: 03/20/25  1:49 PM   Result Value Ref Range    LEUKOCYTE ESTERASE,UA neg     NITRITE,UA neg     SL AMB POCT UROBILINOGEN 0.2     POCT URINE PROTEIN neg      PH,UA 5.0     BLOOD,UA neg     SPECIFIC GRAVITY,UA 1.010     KETONES,UA neg     BILIRUBIN,UA neg     GLUCOSE, UA neg      COLOR,UA yellow     CLARITY,UA clear

## 2025-03-20 NOTE — ASSESSMENT & PLAN NOTE
Patient has not had prostate cancer screening in several years and is wishing to follow with a urologist for yearly screening.  He denies any family history of prostate cancer.  His last PSA was low in 2021 and was reported at 1.6.  I recommend having a repeat PSA now and I will call him with these results.  If everything remains stable we can plan to follow-up in 1 year.    Lab Results   Component Value Date    PSA 1.6 11/13/2021    PSA 1.3 11/04/2020    PSA 1.2 11/25/2019      Orders:    POCT urine dip    PSA, Total Screen; Future

## 2025-03-20 NOTE — ASSESSMENT & PLAN NOTE
Continue to follow with endocrinologist  Lab Results   Component Value Date    HGBA1C 6.5 (H) 02/08/2025

## 2025-03-21 ENCOUNTER — RESULTS FOLLOW-UP (OUTPATIENT)
Dept: UROLOGY | Facility: AMBULATORY SURGERY CENTER | Age: 65
End: 2025-03-21

## 2025-03-21 DIAGNOSIS — Z12.5 SCREENING FOR PROSTATE CANCER: Primary | ICD-10-CM

## 2025-04-19 PROBLEM — Z12.5 SCREENING FOR PROSTATE CANCER: Status: RESOLVED | Noted: 2025-03-20 | Resolved: 2025-04-19
